# Patient Record
Sex: MALE | Race: WHITE | NOT HISPANIC OR LATINO | Employment: FULL TIME | URBAN - NONMETROPOLITAN AREA
[De-identification: names, ages, dates, MRNs, and addresses within clinical notes are randomized per-mention and may not be internally consistent; named-entity substitution may affect disease eponyms.]

---

## 2017-05-19 ENCOUNTER — OFFICE VISIT - GICH (OUTPATIENT)
Dept: FAMILY MEDICINE | Facility: OTHER | Age: 32
End: 2017-05-19

## 2017-05-19 ENCOUNTER — HISTORY (OUTPATIENT)
Dept: FAMILY MEDICINE | Facility: OTHER | Age: 32
End: 2017-05-19

## 2017-05-19 DIAGNOSIS — J00 ACUTE NASOPHARYNGITIS (COMMON COLD): ICD-10-CM

## 2018-01-05 NOTE — PROGRESS NOTES
Patient Information     Patient Name MRN Sex Lucho Cheema [id number] Male 1985      Progress Notes by Alessia Sepulveda NP at 2017  3:00 PM     Author:  Alessia Sepulveda NP Service:  (none) Author Type:  PHYS- Nurse Practitioner     Filed:  2017  6:33 PM Encounter Date:  2017 Status:  Signed     :  Alessia Sepulveda NP (PHYS- Nurse Practitioner)            Nursing Notes:   Jessica Salmon  2017  2:37 PM  Signed  Patient presents with runny nose, headache, cough nonproductive, sore throat starting Wednesday. Patient has a  child. Patient states he always gets bronchitis when he stops smoking. Patient is also taking fish oil 350mg, milk thistle 250 mg. Jessica Salmon LPN .............2017  2:21 PM  SUBJECTIVE:    Lucho Candelaria is a 32 y.o. male who presents for Cough congestion    URI    This is a new problem. Episode onset: 2-3 days. The problem has been unchanged. There has been no fever. Associated symptoms include congestion, coughing, a plugged ear sensation, rhinorrhea and a sore throat. Pertinent negatives include no ear pain, headaches, nausea, rash, sinus pain, sneezing, swollen glands, vomiting or wheezing. He has tried increased fluids for the symptoms. The treatment provided no relief.       Current Outpatient Prescriptions on File Prior to Visit       Medication  Sig Dispense Refill     albuterol HFA (PROVENTIL HFA) 90 mcg/actuation inhaler Inhale 2 Puffs by mouth every 4 hours if needed (cough). 1 Inhaler 0     amLODIPine (NORVASC) 10 mg tablet Take one half tab daily  0     aspirin 325 mg tablet Take 325 mg by mouth once daily with a meal.       atenolol (TENORMIN) 50 mg tablet Take 50 mg by mouth once daily.       Fenofibrate Nanocrystallized 160 mg tab Take  by mouth once daily with a meal.  0     hydrochlorothiazide 12.5 mg tablet Take 1 tablet by mouth once daily.  0     ibuprofen (ADVIL; MOTRIN) 800 mg tablet Take 1 tablet by mouth  "every 6 hours if needed for Pain. 40 tablet 0     lisinopril (PRINIVIL; ZESTRIL) 5 mg tablet Take 1 tablet by mouth once daily.  0     Omeprazole 20 mg tablet Take 1 tablet by mouth once daily.  0     Potassium Bicarb-Citric Acid 10 mEq tbef Take  by mouth.  0     No current facility-administered medications on file prior to visit.        REVIEW OF SYSTEMS:  Review of Systems   HENT: Positive for congestion, rhinorrhea and sore throat. Negative for ear pain and sneezing.    Respiratory: Positive for cough. Negative for wheezing.    Gastrointestinal: Negative for nausea and vomiting.   Skin: Negative for rash.   Neurological: Negative for headaches.       OBJECTIVE:  /78  Pulse 60  Temp 97.8  F (36.6  C) (Tympanic)  Ht 1.695 m (5' 6.73\")  Wt 100.3 kg (221 lb 3.2 oz)  BMI 34.92 kg/m2    EXAM:   Physical Exam   Constitutional: He is well-developed, well-nourished, and in no distress.   HENT:   Head: Normocephalic and atraumatic.   Right Ear: Tympanic membrane and ear canal normal.   Left Ear: Tympanic membrane and ear canal normal.   Nose: Rhinorrhea present. Right sinus exhibits no maxillary sinus tenderness and no frontal sinus tenderness. Left sinus exhibits no maxillary sinus tenderness and no frontal sinus tenderness.   Mouth/Throat: Uvula is midline and mucous membranes are normal. Posterior oropharyngeal erythema present. No oropharyngeal exudate or posterior oropharyngeal edema.   Eyes: Conjunctivae are normal.   Neck: Neck supple.   Cardiovascular: Normal rate, regular rhythm and normal heart sounds.    Pulmonary/Chest: Breath sounds normal. No respiratory distress. He has no decreased breath sounds. He has no wheezes. He has no rhonchi. He has no rales.   No cough is heard   Lymphadenopathy:     He has no cervical adenopathy.   Nursing note and vitals reviewed.      ASSESSMENT/PLAN:    ICD-10-CM    1. Acute nasopharyngitis J00         Plan:  He states he always gets Bronchitis. Asks for a z pac, as " he does not have time to be ill. Discussed viral illnesses including bronchitis. Discussed he has a cold, and otc and home cares are his best bet.   Symptoms likely due to virus. No antibiotic is needed at this time. Symptoms typically worse on days 2-5 and then stabilize and you are sick for days 5-12. Days 12-14 there is slow resolution and if there is a cough, studies show it can linger longer, however one is not as ill as in the beginning. If symptoms begin worsening or fail to improve after 14 days, return to clinic for reevaluation. All questions were answered and he is in agreement with plan.         ANA MARÍA ARCHULETA NP ....................  5/19/2017   6:33 PM

## 2018-01-05 NOTE — PATIENT INSTRUCTIONS
Patient Information     Patient Name MRN Lucho Yan [id number] Male 1985      Patient Instructions by Alessia Sepulveda NP at 2017  3:00 PM     Author:  Alessia Sepulveda NP Service:  (none) Author Type:  PHYS- Nurse Practitioner     Filed:  2017  2:24 PM Encounter Date:  2017 Status:  Signed     :  Alessia Sepulveda NP (PHYS- Nurse Practitioner)            Cold Medicines   What are cold medicines?  Symptoms of the common cold start gradually over several days and usually last about two weeks. Symptoms may include sneezing, a stuffy or runny nose, sore throat, cough, watery eyes, mild headache, or body aches. A cold will go away on its own without treatment. However, there are many nonprescription products that may help relieve some of the symptoms of a cold. Cold medicines often contain more than one ingredient and are used to treat more than one symptom. Read the labels and buy products that have only the ingredients that you need. If you are not sure which medicine is best, ask your pharmacist.  How do they work?  Decongestants reduce swelling in your nose and sinuses. They may also lessen the amount of mucus made by your nose. If you use decongestants more often than directed, your stuffy nose may get worse.   Antihistamines block the effect of histamine. Histamine is a chemical your body makes when you have an allergic reaction. Antihistamines are most often used to treat itchy or watery eyes or a stuffy or runny nose caused by an allergy. Antihistamines may not help a stuffy or runny nose caused by a cold because they can make mucus thick and dry.  Mucolytics are medicines that make mucus thinner so that it is easier to cough up out of your throat and lungs.  Expectorants are cough medicines that may help to keep the mucus thin and bring up mucus from the lungs when you cough. This may relieve chest congestion and make it easier to breathe.   Cough suppressants  (antitussives) are medicines that lessen the urge to cough. They may give relief from a dry, hacking cough. If you have a cough that is wet sounding and produces mucus, it is important for you to cough the mucus up out of your lungs. For this reason, cough suppressants are not recommended for a wet sounding cough.  Fever and pain relievers, such as acetaminophen, aspirin, or other nonsteroidal anti-inflammatory drugs (NSAIDs), are often included in cold medicine. Read labels carefully to avoid taking more medicine than you need.  What else do I need to know about this medicine?    Talk to your healthcare provider if your symptoms start suddenly or you have severe symptoms. This may mean you have something more serious than a cold.    Follow the directions that come with your medicine, including information about food or alcohol. Make sure you know how and when to take your medicine. Do not take more or less than you are supposed to take.    Try to get all of your medicine at the same place. Your pharmacist can help make sure that all of your medicines are safe to take together.    Keep a list of your medicines with you. List all of the prescription medicines, nonprescription medicines, supplements, natural remedies, and vitamins that you take. Tell all healthcare providers who treat you about all of the products you are taking.    Many medicines have side effects. A side effect is a symptom or problem that is caused by the medicine. Ask your healthcare provider or pharmacist what side effects the medicine may cause and what you should do if you have side effects.    Nonsteroidal anti-inflammatory medicines (NSAIDs), such as ibuprofen, naproxen, and aspirin, may cause stomach bleeding and other problems. These risks increase with age. Read the label and take as directed. Unless recommended by your healthcare provider, do not take for more than 10 days for any reason.    Acetaminophen may cause liver damage or other  problems. Unless recommended by your provider, don't take more than 3000 milligrams (mg) in 24 hours. To make sure you don t take too much, check other medicines you take to see if they also contain acetaminophen. Ask your provider if you need to avoid drinking alcohol while taking this medicine.  If you have any questions, ask your healthcare provider or pharmacist for more information. Be sure to keep all appointments for provider visits or tests.        Symptoms likely due to virus. No antibiotic is needed at this time. Symptoms typically worse on days 2-5 and then stabilize and you are sick for days 5-12. Days 12-14 there is slow resolution and if there is a cough, studies show it can linger longer, however one is not as ill as in the beginning. If symptoms begin worsening or fail to improve after 14 days, return to clinic for reevaluation.

## 2018-01-05 NOTE — NURSING NOTE
Patient Information     Patient Name MRN Sex Lucho Cheema [id number] Male 1985      Nursing Note by Jessica Salmon at 2017  3:00 PM     Author:  Jessica Salmon Service:  (none) Author Type:  NURS- Student Practical Nurse     Filed:  2017  2:37 PM Encounter Date:  2017 Status:  Signed     :  Jessica Salmon (NURS- Student Practical Nurse)            Patient presents with runny nose, headache, cough nonproductive, sore throat starting Wednesday. Patient has a  child. Patient states he always gets bronchitis when he stops smoking. Patient is also taking fish oil 350mg, milk thistle 250 mg. Jessica Salmon LPN .............2017  2:21 PM

## 2018-01-18 PROBLEM — Z72.0 TOBACCO USE: Status: ACTIVE | Noted: 2018-01-18

## 2018-01-18 RX ORDER — AMLODIPINE BESYLATE 10 MG/1
TABLET ORAL
COMMUNITY
Start: 2015-05-14

## 2018-01-18 RX ORDER — FENOFIBRATE 160 MG/1
TABLET ORAL
COMMUNITY
Start: 2015-05-14

## 2018-01-18 RX ORDER — ASPIRIN 325 MG
325 TABLET ORAL
COMMUNITY

## 2018-01-18 RX ORDER — IBUPROFEN 800 MG/1
800 TABLET, FILM COATED ORAL EVERY 6 HOURS PRN
COMMUNITY
Start: 2016-08-30

## 2018-01-18 RX ORDER — ATENOLOL 50 MG/1
50 TABLET ORAL DAILY
COMMUNITY

## 2018-01-18 RX ORDER — NICOTINE POLACRILEX 4 MG/1
20 GUM, CHEWING ORAL DAILY
COMMUNITY
Start: 2015-05-14

## 2018-01-18 RX ORDER — LISINOPRIL 5 MG/1
5 TABLET ORAL DAILY
COMMUNITY
Start: 2015-05-14 | End: 2021-08-23

## 2018-01-18 RX ORDER — HYDROCHLOROTHIAZIDE 12.5 MG/1
12.5 TABLET ORAL DAILY
COMMUNITY
Start: 2015-05-14 | End: 2021-08-23

## 2018-01-18 RX ORDER — ALBUTEROL SULFATE 90 UG/1
2 AEROSOL, METERED RESPIRATORY (INHALATION) EVERY 4 HOURS PRN
COMMUNITY
Start: 2016-08-30 | End: 2021-08-23

## 2018-01-27 VITALS
DIASTOLIC BLOOD PRESSURE: 78 MMHG | HEART RATE: 60 BPM | BODY MASS INDEX: 34.72 KG/M2 | SYSTOLIC BLOOD PRESSURE: 120 MMHG | TEMPERATURE: 97.8 F | HEIGHT: 67 IN | WEIGHT: 221.2 LBS

## 2020-11-23 ENCOUNTER — ALLIED HEALTH/NURSE VISIT (OUTPATIENT)
Dept: FAMILY MEDICINE | Facility: OTHER | Age: 35
End: 2020-11-23
Attending: FAMILY MEDICINE
Payer: COMMERCIAL

## 2020-11-23 DIAGNOSIS — R09.81 NASAL CONGESTION: Primary | ICD-10-CM

## 2020-11-23 PROCEDURE — U0003 INFECTIOUS AGENT DETECTION BY NUCLEIC ACID (DNA OR RNA); SEVERE ACUTE RESPIRATORY SYNDROME CORONAVIRUS 2 (SARS-COV-2) (CORONAVIRUS DISEASE [COVID-19]), AMPLIFIED PROBE TECHNIQUE, MAKING USE OF HIGH THROUGHPUT TECHNOLOGIES AS DESCRIBED BY CMS-2020-01-R: HCPCS | Mod: ZL | Performed by: FAMILY MEDICINE

## 2020-11-23 PROCEDURE — C9803 HOPD COVID-19 SPEC COLLECT: HCPCS

## 2020-11-23 PROCEDURE — 99207 PR NO CHARGE NURSE ONLY: CPT

## 2020-11-25 LAB
SARS-COV-2 RNA SPEC QL NAA+PROBE: NOT DETECTED
SPECIMEN SOURCE: NORMAL

## 2020-12-14 ENCOUNTER — HEALTH MAINTENANCE LETTER (OUTPATIENT)
Age: 35
End: 2020-12-14

## 2021-08-23 ENCOUNTER — OFFICE VISIT (OUTPATIENT)
Dept: FAMILY MEDICINE | Facility: OTHER | Age: 36
End: 2021-08-23
Attending: PHYSICIAN ASSISTANT
Payer: COMMERCIAL

## 2021-08-23 VITALS
OXYGEN SATURATION: 98 % | HEART RATE: 88 BPM | RESPIRATION RATE: 16 BRPM | DIASTOLIC BLOOD PRESSURE: 98 MMHG | TEMPERATURE: 99 F | SYSTOLIC BLOOD PRESSURE: 154 MMHG | WEIGHT: 206.3 LBS | BODY MASS INDEX: 32.57 KG/M2

## 2021-08-23 DIAGNOSIS — H65.91 RIGHT NON-SUPPURATIVE OTITIS MEDIA: Primary | ICD-10-CM

## 2021-08-23 PROCEDURE — 99203 OFFICE O/P NEW LOW 30 MIN: CPT | Performed by: PHYSICIAN ASSISTANT

## 2021-08-23 RX ORDER — LISINOPRIL/HYDROCHLOROTHIAZIDE 10-12.5 MG
1 TABLET ORAL DAILY
COMMUNITY
Start: 2021-07-21

## 2021-08-23 ASSESSMENT — PAIN SCALES - GENERAL: PAINLEVEL: NO PAIN (0)

## 2021-08-23 NOTE — PROGRESS NOTES
ASSESSMENT/PLAN:    I have reviewed the nursing notes.  I have reviewed the findings, diagnosis, plan and need for follow up with the patient.    1. Right non-suppurative otitis media  - amoxicillin-clavulanate (AUGMENTIN) 875-125 MG tablet; Take 1 tablet by mouth 2 times daily for 7 days  Dispense: 14 tablet; Refill: 0  -Vital signs stable. PE consistent with ear infection of right ears. Treatment of choice includes antibiotic regimen (Augmentin, alternating tylenol and ibuprofen every 4-6 hours as needed (if able, daily limits reviewed in AVS and verbally with patient), warm compresses, other symptomatic remedies. Avoid trauma to ear(s) such as Q-tips. If symptoms change or worsen, recommend follow up for reevaluation (high fevers, worsening pain, abnormal drainage or odor from ear, etc.). Discussed if ear infections become increasingly common, as this point, a referral to ENT can be made, typically by PCP. Patient is in agreement and understanding of the above treatment plan. All questions and concerns were addressed and answered to patient's satisfaction. AVS reviewed with patient.     Discussed warning signs/symptoms indicative of need to f/u    Follow up if symptoms persist or worsen or concerns    I explained my diagnostic considerations and recommendations to the patient, who voiced understanding and agreement with the treatment plan. All questions were answered. We discussed potential side effects of any prescribed or recommended therapies, as well as expectations for response to treatments.    Shante Camara PA-C  8/23/2021  4:19 PM    HPI:    Lucho Candelaria is a 36 year old male  who presents to [facility] today for concerns of right ear pain x 1-2 d duration.     Presence of the following:   No fevers or chills.  No sore throat/pharyngitis/tonsillitis.   No allergy/URI Symptoms  Yes Muffled Sounds/Change in Hearing  No Sensation of Fullness in Ear(s)  No Ringing in Ears/Tinnitus  No Balance Changes  No  Dizziness  No Headache   No Ear Drainage  No Teething  Additional Symptoms: No  Denies persistent hearing loss, foul smelling odor from ear, changes in vision, nausea, vomiting, diarrhea, chest pain, shortness of breath.     No Recent URI or other illness  History of otitis media: No  History of HEENT surgery (PE tubes, tonsillectomy/adenoidectomy, etc.): No  Recent Course of ABX: No    Treatments Tried: OTC analgesics  Prior History of Similar Symptoms: No    PCP - None local    History reviewed. No pertinent past medical history.  History reviewed. No pertinent surgical history.  Social History     Tobacco Use     Smoking status: Current Every Day Smoker     Packs/day: 1.00     Types: Cigarettes     Smokeless tobacco: Never Used   Substance Use Topics     Alcohol use: Yes     Alcohol/week: 3.0 standard drinks     Current Outpatient Medications   Medication Sig Dispense Refill     amLODIPine (NORVASC) 10 MG tablet Take one half tab daily       amoxicillin-clavulanate (AUGMENTIN) 875-125 MG tablet Take 1 tablet by mouth 2 times daily for 7 days 14 tablet 0     aspirin (GOODSENSE ASPIRIN) 325 MG tablet Take 325 mg by mouth daily with food       atenolol (TENORMIN) 50 MG tablet Take 50 mg by mouth daily       fenofibrate 160 MG tablet Take  by mouth once daily with a meal.       ibuprofen (ADVIL/MOTRIN) 800 MG tablet Take 800 mg by mouth every 6 hours as needed for pain       lisinopril-hydrochlorothiazide (ZESTORETIC) 10-12.5 MG tablet Take 1 tablet by mouth daily       omeprazole (RA OMEPRAZOLE) 20 MG tablet Take 20 mg by mouth daily       Potassium Bicarb-Citric Acid 10 MEQ TBEF Take  by mouth.       No Known Allergies  Past medical history, past surgical history, current medications and allergies reviewed and accurate to the best of my knowledge.      ROS:  Refer to HPI    BP (!) 154/98   Pulse 88   Temp 99  F (37.2  C) (Tympanic)   Resp 16   Wt 93.6 kg (206 lb 4.8 oz)   SpO2 98%   BMI 32.57 kg/m       EXAM:  General Appearance: Well appearing 36-year old male, appropriate appearance for age. No acute distress  Ears: Left TM intact, translucent with bony landmarks appreciated, no erythema, no effusion, no bulging, no purulence.  Right TM erythematous and bulging with trace effusion, no purulence noted.  Left auditory canal clear.  Right auditory canal clear.  Normal external ears, non tender.  Eyes: conjunctivae normal without erythema or irritation, corneas clear, no drainage or crusting, no eyelid swelling, pupils equal   Orophayrnx: moist mucous membranes, posterior pharynx without erythema, tonsils without hypertrophy, no erythema, no exudates or petechiae, no post nasal drip seen, no trismus, voice clear.    Sinuses:  No sinus tenderness upon palpation of the frontal or maxillary sinuses  Nose:  Bilateral nares: no erythema, no edema, no drainage or congestion   Neck: supple without adenopathy  Respiratory: normal chest wall and respirations.  Normal effort.  Clear to auscultation bilaterally, no wheezing, crackles or rhonchi.  No increased work of breathing.  No cough appreciated.  Cardiac: RRR with no murmurs  Dermatological: no rashes noted of exposed skin  Psychological: normal affect, alert, oriented, and pleasant.     Labs:  None     Xray:  None

## 2021-08-23 NOTE — NURSING NOTE
"Chief Complaint   Patient presents with     Ear Problem     Had pain in right ear.     Initial Pulse 88   Temp 99  F (37.2  C) (Tympanic)   Resp 16   Wt 93.6 kg (206 lb 4.8 oz)   SpO2 98%   BMI 32.57 kg/m   Estimated body mass index is 32.57 kg/m  as calculated from the following:    Height as of 5/19/17: 1.695 m (5' 6.73\").    Weight as of this encounter: 93.6 kg (206 lb 4.8 oz).     FOOD SECURITY SCREENING QUESTIONS  Hunger Vital Signs:  Within the past 12 months we worried whether our food would run out before we got money to buy more. Never  Within the past 12 months the food we bought just didn't last and we didn't have money to get more. Never      Medication Reconciliation: Complete      Clyde Truong LPN   "

## 2021-10-03 ENCOUNTER — HEALTH MAINTENANCE LETTER (OUTPATIENT)
Age: 36
End: 2021-10-03

## 2021-10-08 ENCOUNTER — ALLIED HEALTH/NURSE VISIT (OUTPATIENT)
Dept: FAMILY MEDICINE | Facility: OTHER | Age: 36
End: 2021-10-08
Attending: FAMILY MEDICINE
Payer: OTHER GOVERNMENT

## 2021-10-08 DIAGNOSIS — R05.9 COUGH: Primary | ICD-10-CM

## 2021-10-08 PROCEDURE — U0003 INFECTIOUS AGENT DETECTION BY NUCLEIC ACID (DNA OR RNA); SEVERE ACUTE RESPIRATORY SYNDROME CORONAVIRUS 2 (SARS-COV-2) (CORONAVIRUS DISEASE [COVID-19]), AMPLIFIED PROBE TECHNIQUE, MAKING USE OF HIGH THROUGHPUT TECHNOLOGIES AS DESCRIBED BY CMS-2020-01-R: HCPCS | Mod: ZL

## 2021-10-08 PROCEDURE — C9803 HOPD COVID-19 SPEC COLLECT: HCPCS

## 2021-10-09 LAB — SARS-COV-2 RNA RESP QL NAA+PROBE: NEGATIVE

## 2022-01-22 ENCOUNTER — HEALTH MAINTENANCE LETTER (OUTPATIENT)
Age: 37
End: 2022-01-22

## 2022-09-04 ENCOUNTER — HEALTH MAINTENANCE LETTER (OUTPATIENT)
Age: 37
End: 2022-09-04

## 2022-12-20 ENCOUNTER — HOSPITAL ENCOUNTER (EMERGENCY)
Facility: OTHER | Age: 37
Discharge: HOME | End: 2022-12-20
Attending: STUDENT IN AN ORGANIZED HEALTH CARE EDUCATION/TRAINING PROGRAM | Admitting: STUDENT IN AN ORGANIZED HEALTH CARE EDUCATION/TRAINING PROGRAM
Payer: COMMERCIAL

## 2022-12-20 VITALS
SYSTOLIC BLOOD PRESSURE: 132 MMHG | RESPIRATION RATE: 20 BRPM | BODY MASS INDEX: 33.3 KG/M2 | HEART RATE: 62 BPM | TEMPERATURE: 98.6 F | HEIGHT: 66 IN | OXYGEN SATURATION: 100 % | DIASTOLIC BLOOD PRESSURE: 93 MMHG

## 2022-12-20 DIAGNOSIS — S29.011A INTERCOSTAL MUSCLE STRAIN, INITIAL ENCOUNTER: ICD-10-CM

## 2022-12-20 PROCEDURE — 250N000013 HC RX MED GY IP 250 OP 250 PS 637: Performed by: STUDENT IN AN ORGANIZED HEALTH CARE EDUCATION/TRAINING PROGRAM

## 2022-12-20 PROCEDURE — 99283 EMERGENCY DEPT VISIT LOW MDM: CPT | Performed by: STUDENT IN AN ORGANIZED HEALTH CARE EDUCATION/TRAINING PROGRAM

## 2022-12-20 RX ORDER — BENZONATATE 100 MG/1
200 CAPSULE ORAL 3 TIMES DAILY PRN
Qty: 40 CAPSULE | Refills: 0 | Status: SHIPPED | OUTPATIENT
Start: 2022-12-20 | End: 2022-12-20

## 2022-12-20 RX ORDER — BENZONATATE 100 MG/1
200 CAPSULE ORAL ONCE
Status: COMPLETED | OUTPATIENT
Start: 2022-12-20 | End: 2022-12-20

## 2022-12-20 RX ORDER — ACETAMINOPHEN 325 MG/1
650 TABLET ORAL ONCE
Status: COMPLETED | OUTPATIENT
Start: 2022-12-20 | End: 2022-12-20

## 2022-12-20 RX ORDER — BENZONATATE 100 MG/1
200 CAPSULE ORAL 3 TIMES DAILY PRN
Qty: 40 CAPSULE | Refills: 0 | Status: SHIPPED | OUTPATIENT
Start: 2022-12-20 | End: 2023-10-11

## 2022-12-20 RX ORDER — LIDOCAINE 50 MG/G
1 PATCH TOPICAL ONCE
Status: DISCONTINUED | OUTPATIENT
Start: 2022-12-20 | End: 2022-12-20 | Stop reason: HOSPADM

## 2022-12-20 RX ADMIN — BENZONATATE 200 MG: 100 CAPSULE ORAL at 05:12

## 2022-12-20 RX ADMIN — IBUPROFEN 600 MG: 200 TABLET, FILM COATED ORAL at 05:12

## 2022-12-20 RX ADMIN — ACETAMINOPHEN 650 MG: 325 TABLET, FILM COATED ORAL at 05:12

## 2022-12-20 RX ADMIN — LIDOCAINE 5% 1 PATCH: 700 PATCH TOPICAL at 05:12

## 2022-12-20 ASSESSMENT — ACTIVITIES OF DAILY LIVING (ADL): ADLS_ACUITY_SCORE: 35

## 2022-12-20 NOTE — ED PROVIDER NOTES
Emergency Department Provider Note  : 1985 Age: 37 year old Sex: male MRN: [id number]    Chief Complaint   Patient presents with     Rib Pain       Medical Decision Making / Assessment / Plan   37 year old male with a PMH of HTN and ACS who presents with right lateral focal chest pain that began acutely after sneezing and is exacerbated with movement and coughing.  No reproducible pain to palpation along the ribs.  Vitally stable.  Lung sounds clear.  Patient is clearly splinting his breathing due to acute exacerbations of pain.  Presentation consistent with intercostal muscle strain.  No indication for imaging or medical work-up here.  We will treat with anti-inflammatories, lidocaine patches, and a cough suppressant and have him follow-up with his PCP.  Return precautions given.          The patient was informed of the plan and verbalized understanding and agreed with the plan. The patient was given strict return to Emergency Department precautions as well as appropriate follow up instructions. The patient was discharged in stable condition.    New Prescriptions    BENZONATATE (TESSALON) 100 MG CAPSULE    Take 2 capsules (200 mg) by mouth 3 times daily as needed for cough       Final diagnoses:   Intercostal muscle strain, initial encounter       Isidro Diego MD  2022   Emergency Department    Pilar Yepez is a 37 year old male with PMH of HTN and report of ACS who presents at  4:51 AM for evaluation of acute right lateral chest wall pain after sneezing shortly prior to arrival.  Patient has been coughing intermittently over the past several weeks and coughing as well as moving appears to exacerbate acute pain described as largely over the lateral aspect of the right chest wall inferiorly.  No trauma to that area.  Pain particularly worsened with palpation in that area.  No medications taken prior to coming in.  No other symptoms or complaints.    I have reviewed the Medications, Allergies,  "Past Medical and Surgical History, and Social History in the Epic System and with family.    Review of Systems:  Please see HPI for pertinent positives and negatives. All other systems reviewed and found to be negative.      Objective   BP: (!) 132/93  Pulse: 62  Temp: 98.6  F (37  C)  Resp: 20  Height: 167.6 cm (5' 6\")  SpO2: 100 %    Physical Exam:   Gen: Comfortable. NAD  HEENT: MMM. AT/NC.  Eye: EOMI.   CV: Well perfused. RRR.  Pulm:  Clear lung sounds throughout right lung field.  MSK: No reproducible pain to palpation over of the right chest wall.  No contusion or edema.  Abd: ND.   Ext: No significant edema.  Neuro: AOx3, no focal deficit noted  Psych: Appropriate affect, cognition intact    Procedures / Critical Care   Procedures    Critical Care Time: none         Medical/Surgical History:  No past medical history on file.  No past surgical history on file.    Medications:  Current Facility-Administered Medications   Medication     lidocaine (LIDODERM) 5 % Patch 1 patch     Current Outpatient Medications   Medication     benzonatate (TESSALON) 100 MG capsule     amLODIPine (NORVASC) 10 MG tablet     aspirin (GOODSENSE ASPIRIN) 325 MG tablet     atenolol (TENORMIN) 50 MG tablet     fenofibrate 160 MG tablet     ibuprofen (ADVIL/MOTRIN) 800 MG tablet     lisinopril-hydrochlorothiazide (ZESTORETIC) 10-12.5 MG tablet     omeprazole (RA OMEPRAZOLE) 20 MG tablet     Potassium Bicarb-Citric Acid 10 MEQ TBEF       Allergies:  Patient has no known allergies.    Relevant labs, images, EKGs, Epic and outside hospital (if applicable) charts were reviewed. The findings, diagnosis, plan, and need for follow up were discussed with the patient/family. Nursing notes were reviewed.      Isidro Diego MD  12/20/22 3027       Isidro Diego MD  12/20/22 9948    "

## 2022-12-20 NOTE — ED TRIAGE NOTES
Triage Assessment     Row Name 12/20/22 0454       Triage Assessment (Adult)    Airway WDL WDL       Respiratory WDL    Respiratory WDL --  pain in right side of ribs       Skin Circulation/Temperature WDL    Skin Circulation/Temperature WDL WDL       Cardiac WDL    Cardiac WDL WDL

## 2022-12-20 NOTE — ED TRIAGE NOTES
Pt states he sneezed about 0320, felt a crack in the right side of ribs.      Triage Assessment     Row Name 12/20/22 0454       Triage Assessment (Adult)    Airway WDL WDL       Respiratory WDL    Respiratory WDL --  pain in right side of ribs       Skin Circulation/Temperature WDL    Skin Circulation/Temperature WDL WDL       Cardiac WDL    Cardiac WDL WDL

## 2022-12-20 NOTE — DISCHARGE INSTRUCTIONS
We discussed, like you to use anti-inflammatories like ibuprofen and Tylenol in addition to over-the-counter lidocaine patches applied to the most painful area along the right aspect of your chest wall for management of pain from your intercostal muscle strain.  I prescribed a cough suppressant he can use to reduce the cough burden every time he coughs reactivating those muscles and triggering additional pain.  If symptoms not significantly improved over the next couple days with above therapy, touch base with your primary care provider and they can talk to you about additional medications at that time if indicated.

## 2022-12-20 NOTE — Clinical Note
Lucho Candelaria was seen and treated in our emergency department on 12/20/2022.  He may return to work on 12/22/2022.       If you have any questions or concerns, please don't hesitate to call.      Isidro Diego MD

## 2023-04-29 ENCOUNTER — HEALTH MAINTENANCE LETTER (OUTPATIENT)
Age: 38
End: 2023-04-29

## 2023-10-02 ENCOUNTER — OFFICE VISIT (OUTPATIENT)
Dept: FAMILY MEDICINE | Facility: OTHER | Age: 38
End: 2023-10-02
Attending: NURSE PRACTITIONER
Payer: COMMERCIAL

## 2023-10-02 ENCOUNTER — HOSPITAL ENCOUNTER (OUTPATIENT)
Dept: GENERAL RADIOLOGY | Facility: OTHER | Age: 38
Discharge: HOME | End: 2023-10-02
Payer: COMMERCIAL

## 2023-10-02 VITALS
DIASTOLIC BLOOD PRESSURE: 82 MMHG | RESPIRATION RATE: 32 BRPM | SYSTOLIC BLOOD PRESSURE: 132 MMHG | OXYGEN SATURATION: 94 % | HEART RATE: 86 BPM | HEIGHT: 66 IN | WEIGHT: 207 LBS | TEMPERATURE: 100.6 F | BODY MASS INDEX: 33.27 KG/M2

## 2023-10-02 DIAGNOSIS — R50.9 FEVER IN ADULT: ICD-10-CM

## 2023-10-02 DIAGNOSIS — J18.9 MULTIFOCAL PNEUMONIA: Primary | ICD-10-CM

## 2023-10-02 DIAGNOSIS — R05.8 COUGH PRESENT FOR GREATER THAN 3 WEEKS: ICD-10-CM

## 2023-10-02 LAB
FLUAV RNA SPEC QL NAA+PROBE: NEGATIVE
FLUBV RNA RESP QL NAA+PROBE: NEGATIVE
GROUP A STREP BY PCR: NOT DETECTED
RSV RNA SPEC NAA+PROBE: NEGATIVE
SARS-COV-2 RNA RESP QL NAA+PROBE: NEGATIVE

## 2023-10-02 PROCEDURE — C9803 HOPD COVID-19 SPEC COLLECT: HCPCS

## 2023-10-02 PROCEDURE — 99214 OFFICE O/P EST MOD 30 MIN: CPT

## 2023-10-02 PROCEDURE — 71046 X-RAY EXAM CHEST 2 VIEWS: CPT

## 2023-10-02 PROCEDURE — 87637 SARSCOV2&INF A&B&RSV AMP PRB: CPT | Mod: ZL

## 2023-10-02 PROCEDURE — 87651 STREP A DNA AMP PROBE: CPT | Mod: ZL

## 2023-10-02 RX ORDER — ALBUTEROL SULFATE 90 UG/1
2 AEROSOL, METERED RESPIRATORY (INHALATION) EVERY 6 HOURS PRN
Qty: 18 G | Refills: 0 | Status: SHIPPED | OUTPATIENT
Start: 2023-10-02 | End: 2024-02-21

## 2023-10-02 RX ORDER — ALBUTEROL SULFATE 90 UG/1
AEROSOL, METERED RESPIRATORY (INHALATION)
COMMUNITY
Start: 2022-06-24 | End: 2023-10-11

## 2023-10-02 RX ORDER — AZITHROMYCIN 250 MG/1
250 TABLET, FILM COATED ORAL DAILY
Qty: 6 TABLET | Refills: 0 | Status: SHIPPED | OUTPATIENT
Start: 2023-10-02 | End: 2023-10-11

## 2023-10-02 RX ORDER — AMOXICILLIN 500 MG/1
500 CAPSULE ORAL 3 TIMES DAILY
Qty: 30 CAPSULE | Refills: 0 | Status: SHIPPED | OUTPATIENT
Start: 2023-10-02 | End: 2023-10-11

## 2023-10-02 ASSESSMENT — PAIN SCALES - GENERAL: PAINLEVEL: SEVERE PAIN (6)

## 2023-10-02 NOTE — PROGRESS NOTES
ASSESSMENT/PLAN:    I have reviewed the nursing notes.  I have reviewed the findings, diagnosis, plan and need for follow up with the patient.    1. Multifocal pneumonia  2. Cough present for greater than 3 weeks  3. Fever in adult  - amoxicillin (AMOXIL) 500 MG capsule; Take 1 capsule (500 mg) by mouth 3 times daily For 7 days  Dispense: 30 capsule; Refill: 0  - azithromycin (ZITHROMAX) 250 MG tablet; Take 1 tablet (250 mg) by mouth daily Take 2 tablets the first day and 1 tablet each day after  Dispense: 6 tablet; Refill: 0  - albuterol (PROAIR HFA/PROVENTIL HFA/VENTOLIN HFA) 108 (90 Base) MCG/ACT inhaler; Inhale 2 puffs into the lungs every 6 hours as needed  Dispense: 18 g; Refill: 0  - XR Chest 2 Views  - Symptomatic Influenza A/B, RSV, & SARS-CoV2 PCR (COVID-19) Nose  - Group A Streptococcus PCR Throat Swab    Patient presents with upper respiratory symptoms.  Patient's vitals are stable except for increased respiratory rate and temperature.  Patient appears ill but nontoxic.  Chest x-ray indicates multifocal pneumonia involving the lingula and right middle lobe.  Will treat with amoxicillin and azithromycin.  Will treat patient's cough and shortness of breath with an albuterol inhaler as needed. Discussed symptomatic treatment - Encouraged fluids, salt water gargles, honey, elevation, humidifier, sinus rinse/netti pot, lozenges, tea, topical vapor rub, popsicles, rest, etc. May use over-the-counter Tylenol or ibuprofen PRN.  Advised patient that he should follow-up with his PCP in about 2 weeks for a follow-up to make sure that the infection is resolving.    Discussed warning signs/symptoms indicative of need to f/u    Follow up if symptoms persist or worsen or concerns    I explained my diagnostic considerations and recommendations to the patient, who voiced understanding and agreement with the treatment plan. All questions were answered. We discussed potential side effects of any prescribed or recommended  therapies, as well as expectations for response to treatments.    ARIADNA Stephens CNP  10/2/2023  6:59 PM    HPI:    Lucho Candelaria is a 38 year old male  who presents to [facility] today for concerns of URI symptoms    URI, x 2-3 weeks    Symptoms:  YES: +  fevers or chills. Fever, highest reported temperature: 100.6 F  YES: +  sore throat/pharyngitis/tonsillitis.   YES: +  allergy/URI Symptoms  YES: +  muffled sounds/change in hearing  YES: +  sensation of fullness in ear(s)  No ringing in ears/tinnitus  No balance changes  YES: +  dizziness  YES: +  congestion (head/nasal/chest)  YES: +  cough/productive cough  YES: +  post nasal drip   YES: +  headache  YES: +  sinus pain/pressure - occasional  YES: +  myalgias  No otalgia  No rash  Activity Level Changes: Yes: increased fatigue  Appetite/Liquid Intake Changes: Yes: decreased  Changes to Bowel Habits: Yes: diarrhea occasional  Changes to Bladder Habits: No  Additional Symptoms to Report: Yes: shortness of breath, wheezing, occasional chest tightness  History of similar symptoms: Yes  Prior workup: No    Treatments tried: Tylenol/Ibuprofen, Decongestants, OTC Cough med, Fluids, and Rest    Site of exposure: not known.  Type of exposure: not known    Other Pertinent History: asthma, tobacco use, and s/p tonsillectomy    Allergies: adhesive tape    PCP: Dr. Yeh    History reviewed. No pertinent past medical history.  History reviewed. No pertinent surgical history.  Social History     Tobacco Use    Smoking status: Every Day     Packs/day: 0.50     Types: Cigarettes    Smokeless tobacco: Never   Substance Use Topics    Alcohol use: Yes     Alcohol/week: 3.0 standard drinks of alcohol     Current Outpatient Medications   Medication Sig Dispense Refill    amLODIPine (NORVASC) 10 MG tablet Take one half tab daily      aspirin (GOODSENSE ASPIRIN) 325 MG tablet Take 325 mg by mouth daily with food      atenolol (TENORMIN) 50 MG tablet Take 50 mg by mouth daily   "    fenofibrate 160 MG tablet Take  by mouth once daily with a meal.      ibuprofen (ADVIL/MOTRIN) 800 MG tablet Take 800 mg by mouth every 6 hours as needed for pain      lisinopril-hydrochlorothiazide (ZESTORETIC) 10-12.5 MG tablet Take 1 tablet by mouth daily      omeprazole (RA OMEPRAZOLE) 20 MG tablet Take 20 mg by mouth daily      Potassium Bicarb-Citric Acid 10 MEQ TBEF Take  by mouth.      albuterol (PROAIR HFA/PROVENTIL HFA/VENTOLIN HFA) 108 (90 Base) MCG/ACT inhaler Inhale 1-2 Puffs into the lungs every four hours as needed for Shortness of Breath. Shake before using. (Patient not taking: Reported on 10/2/2023)      benzonatate (TESSALON) 100 MG capsule Take 2 capsules (200 mg) by mouth 3 times daily as needed for cough (Patient not taking: Reported on 10/2/2023) 40 capsule 0     Allergies   Allergen Reactions    Adhesive Tape      Past medical history, past surgical history, current medications and allergies reviewed and accurate to the best of my knowledge.      ROS:  Refer to HPI    /82 (BP Location: Right arm, Patient Position: Sitting, Cuff Size: Adult Regular)   Pulse 86   Temp (!) 100.6  F (38.1  C) (Tympanic)   Resp (!) 32   Ht 1.676 m (5' 6\")   Wt 93.9 kg (207 lb)   SpO2 94%   BMI 33.41 kg/m      EXAM:  General Appearance: Ill appearing 38 year old male, appropriate appearance for age. No acute distress   Ears: Left TM intact, translucent with bony landmarks appreciated, no erythema, no effusion, no bulging, no purulence.  Right TM intact, translucent with bony landmarks appreciated, no erythema, no effusion, no bulging, no purulence.  Left auditory canal clear.  Right auditory canal clear.  Normal external ears, non tender.  Eyes: conjunctivae normal without erythema or irritation, corneas clear, no drainage or crusting, no eyelid swelling, pupils equal   Oropharynx: moist mucous membranes, posterior pharynx without erythema, tonsils symmetric and 1+, no erythema, no exudates or " petechiae, no post nasal drip seen, no trismus, voice clear.    Sinuses:  Mild sinus tenderness upon palpation of the frontal and maxillary sinuses  Nose:  Bilateral nares: no erythema, no edema, no drainage or congestion   Neck: supple with bilateral tonsillar adenopathy  Respiratory: Increased chest wall and respirations.  Mildly increased effort.  Expiratory wheezing throughout all lung fields, no crackles or rhonchi.  Mild increased work of breathing.  Moderate cough appreciated.  Cardiac: RRR with no murmurs  Musculoskeletal:  Equal movement of bilateral upper extremities.  Equal movement of bilateral lower extremities.  Normal gait.    Dermatological: no rashes noted of exposed skin  Neuro: Alert and oriented to person, place, and time.    Psychological: normal affect, alert, oriented, and pleasant.     Labs & Xray:  Results for orders placed or performed in visit on 10/02/23   XR Chest 2 Views     Status: None    Narrative    Procedure:XR CHEST 2 VIEWS    Clinical history:Male, 38 years, Cough present for greater than 3  weeks; Fever in adult    Technique: Two views are submitted.    Comparison: 5/17/2014    Findings: The cardiac silhouette is within normal limits.. The  pulmonary vasculature is within normal limits.    The lungs demonstrate subtle, patchy areas of airspace consolidation  in the lingula and right middle lobe. Bony structures are  unremarkable.      Impression    Impression:   Multifocal pneumonia involving the lingula and right middle lobe.     COURTNEY RYAN MD         SYSTEM ID:  [id number]   Symptomatic Influenza A/B, RSV, & SARS-CoV2 PCR (COVID-19) Nose     Status: Normal    Specimen: Nose; Swab   Result Value Ref Range    Influenza A PCR Negative Negative    Influenza B PCR Negative Negative    RSV PCR Negative Negative    SARS CoV2 PCR Negative Negative    Narrative    Testing was performed using the Xpert Xpress CoV2/Flu/RSV Assay on the Cepheid GeneXpert Instrument. This test should  be ordered for the detection of SARS-CoV-2, influenza, and RSV viruses in individuals who meet clinical and/or epidemiological criteria. Test performance is unknown in asymptomatic patients. This test is for in vitro diagnostic use under the FDA EUA for laboratories certified under CLIA to perform high or moderate complexity testing. This test has not been FDA cleared or approved. A negative result does not rule out the presence of PCR inhibitors in the specimen or target RNA in concentration below the limit of detection for the assay. If only one viral target is positive but coinfection with multiple targets is suspected, the sample should be re-tested with another FDA cleared, approved, or authorized test, if coinfection would change clinical management. This test was validated by the [facility]. These laboratories are certified under the Clinical Laboratory Improvement Amendments of 1988 (CLIA-88) as qualified to perform high complexity laboratory testing.

## 2023-10-02 NOTE — LETTER
October 2, 2023      Lucho Candelaria  [address]  [place] [id number]        To Whom It May Concern:    Lucho Candelaria  was seen on 10/2/23.  Please excuse him  until 10/5/23 due to illness.        Sincerely,        ARIADNA Stephens CNP

## 2023-10-02 NOTE — PROGRESS NOTES
"Chief Complaint   Patient presents with    Cough     X 3.5 weeks    Headache     X 2.5 weeks    Back Pain     All the way to neck for a few days         Initial There were no vitals taken for this visit. Estimated body mass index is 33.3 kg/m  as calculated from the following:    Height as of 12/20/22: 1.676 m (5' 6\").    Weight as of 8/23/21: 93.6 kg (206 lb 4.8 oz).     Advance Care Directive on file? ***  Advance Care Directive provided to patient? ***    FOOD SECURITY SCREENING QUESTIONS:    The next two questions are to help us understand your food security.  If you are feeling you need any assistance in this area, we have resources available to support you today.    Hunger Vital Signs:  Within the past 12 months we worried whether our food would run out before we got money to buy more. {food list:323816}  Within the past 12 months the food we bought just didn't last and we didn't have money to get more. {food list:217665}  Fiona Osei LPN,LPN on 10/2/2023 at 6:53 PM      Fiona Osei LPN     "

## 2023-10-02 NOTE — NURSING NOTE
"Chief Complaint   Patient presents with    Cough     X 3.5 weeks    Headache     X 2.5 weeks    Back Pain     All the way to neck for a few days     Attempted to tx with day/nightquil, mucinex, alkaseltzer, and ibuprofen with little relief. Sx worsening  Patient is out of albuterol inhaler.    Initial /82 (BP Location: Right arm, Patient Position: Sitting, Cuff Size: Adult Regular)   Pulse 86   Temp (!) 100.6  F (38.1  C) (Tympanic)   Resp (!) 32   Ht 1.676 m (5' 6\")   Wt 93.9 kg (207 lb)   SpO2 94%   BMI 33.41 kg/m   Estimated body mass index is 33.41 kg/m  as calculated from the following:    Height as of this encounter: 1.676 m (5' 6\").    Weight as of this encounter: 93.9 kg (207 lb).     Advance Care Directive on file? no    FOOD SECURITY SCREENING QUESTIONS:    The next two questions are to help us understand your food security.  If you are feeling you need any assistance in this area, we have resources available to support you today.    Hunger Vital Signs:  Within the past 12 months we worried whether our food would run out before we got money to buy more. Never  Within the past 12 months the food we bought just didn't last and we didn't have money to get more. Never  Fiona Osei LPN,LPN on 10/2/2023 at 6:56 PM      Fiona Osei LPN     "

## 2023-10-11 ENCOUNTER — NURSE TRIAGE (OUTPATIENT)
Dept: NURSING | Facility: CLINIC | Age: 38
End: 2023-10-11
Payer: COMMERCIAL

## 2023-10-11 ENCOUNTER — OFFICE VISIT (OUTPATIENT)
Dept: FAMILY MEDICINE | Facility: OTHER | Age: 38
End: 2023-10-11
Attending: NURSE PRACTITIONER
Payer: COMMERCIAL

## 2023-10-11 VITALS
SYSTOLIC BLOOD PRESSURE: 132 MMHG | RESPIRATION RATE: 16 BRPM | HEIGHT: 66 IN | DIASTOLIC BLOOD PRESSURE: 88 MMHG | BODY MASS INDEX: 33.22 KG/M2 | HEART RATE: 91 BPM | TEMPERATURE: 98.7 F | OXYGEN SATURATION: 95 % | WEIGHT: 206.7 LBS

## 2023-10-11 DIAGNOSIS — L29.89 PRURITIC ERYTHEMATOUS RASH: Primary | ICD-10-CM

## 2023-10-11 PROCEDURE — 99213 OFFICE O/P EST LOW 20 MIN: CPT | Performed by: NURSE PRACTITIONER

## 2023-10-11 RX ORDER — PREDNISONE 10 MG/1
TABLET ORAL
Qty: 30 TABLET | Refills: 0 | Status: SHIPPED | OUTPATIENT
Start: 2023-10-11 | End: 2023-10-21

## 2023-10-11 ASSESSMENT — PAIN SCALES - GENERAL: PAINLEVEL: NO PAIN (0)

## 2023-10-11 NOTE — TELEPHONE ENCOUNTER
Pt calling, 2 weeks ago pt had pneumonia, was prescribed amoxicillin and z pack  Now have itching rash, blisters, open sores that are oozing  Penis and scrotum is inflammed, itches and hurting  Rash Started on neck after done with z-pack on Saturday 10/7  done with amoxicillin on Monday 10/9    Triage be seen within 4 hours, pt's PCP is not with , denies UC nearby, will go to ED in [place]. Care advice given    Lane Rivera RN, BSN  10/11/2023 at 6:24 PM  [facility]      Reason for Disposition   Large or small blisters on skin (i.e., fluid filled bubbles or sacs)    Additional Information   Negative: [1] Life-threatening reaction (anaphylaxis) in the past to the same drug AND [2] < 2 hours since exposure   Negative: Difficulty breathing or wheezing   Negative: [1] Hoarseness or cough AND [2] started soon after 1st dose of drug   Negative: [1] Swollen tongue AND [2] started soon after 1st dose of drug   Negative: [1] Purple or blood-colored rash (spots or dots) AND [2] fever   Negative: Sounds like a life-threatening emergency to the triager   Negative: Swollen tongue   Negative: [1] Widespread hives AND [2] onset < 2 hours of exposure to 1st dose of drug   Negative: Fever   Negative: Patient sounds very sick or weak to the triager   Negative: [1] Purple or blood-colored rash (spots or dots) AND [2] no fever AND [3] sounds well to triager   Negative: [1] Taking new prescription medication AND [2] rash within 4 hours of 1st dose    Protocols used: Rash - Widespread On Drugs-A-

## 2023-10-12 NOTE — NURSING NOTE
"Chief Complaint   Patient presents with    Rash    Derm Problem     Itching      Patient is here for a rash and itching. 2 weeks ago patient had pneumonia and was prescribed Amoxicillin. Now has itching rash, open sores, and oozing.     Initial /88   Pulse 91   Temp 98.7  F (37.1  C) (Tympanic)   Resp 16   Ht 1.676 m (5' 6\")   Wt 93.8 kg (206 lb 11.2 oz)   SpO2 95%   BMI 33.36 kg/m   Estimated body mass index is 33.36 kg/m  as calculated from the following:    Height as of this encounter: 1.676 m (5' 6\").    Weight as of this encounter: 93.8 kg (206 lb 11.2 oz).  Medication Reconciliation: complete    Araseli Payton CMA      FOOD SECURITY SCREENING QUESTIONS:    The next two questions are to help us understand your food security.  If you are feeling you need any assistance in this area, we have resources available to support you today.    Hunger Vital Signs:  Within the past 12 months we worried whether our food would run out before we got money to buy more. Never  Within the past 12 months the food we bought just didn't last and we didn't have money to get more. Never  Araseli Payton CMA,LPN on 10/11/2023 at 7:10 PM    "

## 2023-10-12 NOTE — PROGRESS NOTES
ASSESSMENT/PLAN:     I have reviewed the nursing notes.  I have reviewed the findings, diagnosis, plan and need for follow up with the patient.          1. Pruritic erythematous rash    - predniSONE (DELTASONE) 10 MG tablet; Take 4 tablets (40 mg) by mouth daily for 5 days, THEN 2 tablets (20 mg) daily for 5 days.  Dispense: 30 tablet; Refill: 0      Possible Amoxicillin allergy rash as rash started at end of Amoxicillin course but rash is not classic appearance of Amoxicillin rash.  Added Amoxicillin to allergy list as precaution.    Recommend Zyrtec 2 tabs BID until rash and pruritus resolved  Recommend use of Calamine lotion to areas of weeping rash/lesions   Recommend use of Hydrocortisone cream to areas of dry rash   Use cool showers over hot showers to reduce pruritus     Discussed warning signs/symptoms indicative of need to f/u  Follow up if symptoms persist or worsen or concerns      I explained my diagnostic considerations and recommendations to the patient, who voiced understanding and agreement with the treatment plan. All questions were answered. We discussed potential side effects of any prescribed or recommended therapies, as well as expectations for response to treatments.    Tarah Pelaez NP  [facility]      SUBJECTIVE:   Lucho Candelaria is a 38 year old male who presents to clinic today for the following health issues:  Itching and blisters    HPI  Patient was treated for multifocal pneumonia with Zpak and Amoxicillin on 10/2/23.    Patient completed Zpak on 10/7/23 and Amoxicillin on 10/9/23.    Patient is now experiencing itching rash with blisters and open oozing sores for the past 3 days.  Rash is intensely pruritic.  Rash is not painful.  Some burning sensation of groin after scratching.  Started on right side of neck and spread to face, neck and groin.  No oral lesions.  No known outdoor exposures.  No new foods.  No new skin products.  No new detergents.  No throat  swelling or difficulty swallowing.  No shortness of breath.  NO fevers.  No OTC treatments tried.          No past medical history on file.  No past surgical history on file.  Social History     Tobacco Use    Smoking status: Every Day     Packs/day: .5     Types: Cigarettes    Smokeless tobacco: Never   Substance Use Topics    Alcohol use: Yes     Alcohol/week: 3.0 standard drinks of alcohol     Types: 3 Standard drinks or equivalent per week     Current Outpatient Medications   Medication Sig Dispense Refill    albuterol (PROAIR HFA/PROVENTIL HFA/VENTOLIN HFA) 108 (90 Base) MCG/ACT inhaler       amLODIPine (NORVASC) 10 MG tablet Take one half tab daily      aspirin (GOODSENSE ASPIRIN) 325 MG tablet Take 325 mg by mouth daily with food      atenolol (TENORMIN) 50 MG tablet Take 50 mg by mouth daily      fenofibrate 160 MG tablet Take  by mouth once daily with a meal.      ibuprofen (ADVIL/MOTRIN) 800 MG tablet Take 800 mg by mouth every 6 hours as needed for pain      lisinopril-hydrochlorothiazide (ZESTORETIC) 10-12.5 MG tablet Take 1 tablet by mouth daily      omeprazole (RA OMEPRAZOLE) 20 MG tablet Take 20 mg by mouth daily      Potassium Bicarb-Citric Acid 10 MEQ TBEF Take  by mouth.      albuterol (PROAIR HFA/PROVENTIL HFA/VENTOLIN HFA) 108 (90 Base) MCG/ACT inhaler Inhale 2 puffs into the lungs every 6 hours as needed 18 g 0    amoxicillin (AMOXIL) 500 MG capsule Take 1 capsule (500 mg) by mouth 3 times daily For 7 days 30 capsule 0    azithromycin (ZITHROMAX) 250 MG tablet Take 1 tablet (250 mg) by mouth daily Take 2 tablets the first day and 1 tablet each day after 6 tablet 0    benzonatate (TESSALON) 100 MG capsule Take 2 capsules (200 mg) by mouth 3 times daily as needed for cough 40 capsule 0     Allergies   Allergen Reactions    Adhesive Tape          Past medical history, past surgical history, current medications and allergies reviewed and accurate to the best of my knowledge.        OBJECTIVE:     BP  "132/88   Pulse 91   Temp 98.7  F (37.1  C) (Tympanic)   Resp 16   Ht 1.676 m (5' 6\")   Wt 93.8 kg (206 lb 11.2 oz)   SpO2 95%   BMI 33.36 kg/m    Body mass index is 33.36 kg/m .      Physical Exam  General Appearance: Well appearing adult male, appropriate appearance for age. No acute distress  Orophayrnx: moist mucous membranes, pharynx without erythema, tonsils without hypertrophy, tonsils without erythema, no tonsillar exudates, no oral lesions, no palate petechiae, no post nasal drip seen, no trismus, voice clear.    Respiratory: normal chest wall and respirations.  Normal effort.  Occasional  cough appreciated.  Cardiac: RRR with no murmurs  Musculoskeletal:  Equal movement of bilateral upper extremities.  Equal movement of bilateral lower extremities.  Normal gait.    Dermatological: beard with few oozing and crusted lesions.  Anterior chest with erythema, no discrete lesions.  Scrotum and penis with diffuse erythema, no discrete lesions.  Upper eyelids with erythema, no swelling or lesions.    Psychological: normal affect, alert, oriented, and pleasant.           "

## 2023-10-12 NOTE — PATIENT INSTRUCTIONS
Zyrtec 2 tabs twice daily for itching, take until rash/itching resolved    Prednisone - take 4 tabs once daily for 5 days then decrease to 2 tabs daily for 5 days or until rash/itching resolved.  Take with food.    Use hydrocortisone cream up to 3 times daily as needed for itching    Use calamine lotion to groin for itching     Cool showers as hot water can worsen itching

## 2024-02-21 ENCOUNTER — HOSPITAL ENCOUNTER (OUTPATIENT)
Dept: GENERAL RADIOLOGY | Facility: OTHER | Age: 39
Discharge: HOME | End: 2024-02-21
Payer: COMMERCIAL

## 2024-02-21 ENCOUNTER — OFFICE VISIT (OUTPATIENT)
Dept: FAMILY MEDICINE | Facility: OTHER | Age: 39
End: 2024-02-21
Payer: COMMERCIAL

## 2024-02-21 VITALS
HEART RATE: 58 BPM | OXYGEN SATURATION: 100 % | RESPIRATION RATE: 18 BRPM | HEIGHT: 67 IN | TEMPERATURE: 98.5 F | BODY MASS INDEX: 34.21 KG/M2 | DIASTOLIC BLOOD PRESSURE: 88 MMHG | SYSTOLIC BLOOD PRESSURE: 132 MMHG | WEIGHT: 218 LBS

## 2024-02-21 DIAGNOSIS — R05.3 PERSISTENT COUGH: ICD-10-CM

## 2024-02-21 DIAGNOSIS — J06.9 VIRAL URI WITH COUGH: Primary | ICD-10-CM

## 2024-02-21 DIAGNOSIS — R06.02 SHORTNESS OF BREATH: ICD-10-CM

## 2024-02-21 DIAGNOSIS — R06.2 WHEEZING: ICD-10-CM

## 2024-02-21 PROCEDURE — 71046 X-RAY EXAM CHEST 2 VIEWS: CPT

## 2024-02-21 PROCEDURE — 99214 OFFICE O/P EST MOD 30 MIN: CPT

## 2024-02-21 PROCEDURE — 250N000009 HC RX 250

## 2024-02-21 RX ORDER — ALBUTEROL SULFATE 90 UG/1
2 AEROSOL, METERED RESPIRATORY (INHALATION) EVERY 6 HOURS PRN
Qty: 18 G | Refills: 0 | Status: SHIPPED | OUTPATIENT
Start: 2024-02-21

## 2024-02-21 RX ORDER — PREDNISONE 20 MG/1
TABLET ORAL
Qty: 20 TABLET | Refills: 0 | Status: SHIPPED | OUTPATIENT
Start: 2024-02-21

## 2024-02-21 RX ORDER — DEXAMETHASONE SODIUM PHOSPHATE 4 MG/ML
10 VIAL (ML) INJECTION ONCE
Status: COMPLETED | OUTPATIENT
Start: 2024-02-21 | End: 2024-02-21

## 2024-02-21 RX ORDER — BENZONATATE 100 MG/1
100 CAPSULE ORAL 3 TIMES DAILY PRN
Qty: 90 CAPSULE | Refills: 0 | Status: SHIPPED | OUTPATIENT
Start: 2024-02-21 | End: 2024-03-22

## 2024-02-21 RX ADMIN — DEXAMETHASONE SODIUM PHOSPHATE 10 MG: 4 INJECTION, SOLUTION INTRAMUSCULAR; INTRAVENOUS at 12:58

## 2024-02-21 ASSESSMENT — PAIN SCALES - GENERAL: PAINLEVEL: NO PAIN (0)

## 2024-02-21 NOTE — NURSING NOTE
"Chief Complaint   Patient presents with    Cough     Cough present since February 5th. Patient states he has passed out 5-6 times from coughing so hard.         Initial /88 (BP Location: Right arm, Patient Position: Sitting, Cuff Size: Adult Regular)   Pulse 58   Temp 98.5  F (36.9  C) (Temporal)   Resp 18   Ht 1.702 m (5' 7\")   Wt 98.9 kg (218 lb)   SpO2 100%   BMI 34.14 kg/m   Estimated body mass index is 34.14 kg/m  as calculated from the following:    Height as of this encounter: 1.702 m (5' 7\").    Weight as of this encounter: 98.9 kg (218 lb).       Maricarmen Moura     "

## 2024-02-21 NOTE — PROGRESS NOTES
ASSESSMENT/PLAN:    I have reviewed the nursing notes.  I have reviewed the findings, diagnosis, plan and need for follow up with the patient.    1. Persistent cough  2. Wheezing  3. Shortness of breath  4. Viral URI with cough    -Patient declining strep and influenza testing at this time just wants reassurance that he has not developed pneumonia as he has history of pneumonia from October 2023.    - XR Chest 2 Views-clear chest x-ray    - albuterol (PROAIR HFA/PROVENTIL HFA/VENTOLIN HFA) 108 (90 Base) MCG/ACT inhaler; Inhale 2 puffs into the lungs every 6 hours as needed  Dispense: 18 g; Refill: 0    - dexAMETHasone (DECADRON) injectable solution used ORALLY 10 mg  - predniSONE (DELTASONE) 20 MG tablet; Take 3 tabs by mouth daily x 3 days, then 2 tabs daily x 3 days, then 1 tab daily x 3 days, then 1/2 tab daily x 3 days.  Dispense: 20 tablet; Refill: 0-dose given in the clinic    - benzonatate (TESSALON) 100 MG capsule; Take 1 capsule (100 mg) by mouth 3 times daily as needed for cough  Dispense: 90 capsule; Refill: 0    4. Viral URI with cough    - Discussed with patient viral vs bacterial respiratory illness, and evidence based practice and guidelines for cough, wheezing, shortness of breath without fever or infiltrate on xray are not indicative of pneumonia or bacterial illness and should not be treated with antibiotics.      - Discussed with patient that symptoms and exam are consistent with viral illness.  Discussed that symptomatic treatment of cough, wheezing and shortness of breath is appropriate but not with antibiotics.      - Symptomatic treatment - Encouraged fluids, salt water gargles, honey (only if greater than 1 year in age due to risk of botulism), elevation, humidifier, sinus rinse/netti pot, lozenges, tea, topical vapor rub, popsicles, rest, etc     - May use over-the-counter Tylenol or ibuprofen PRN    - Discussed warning signs/symptoms indicative of need to f/u    - Follow up if symptoms  persist or worsen or concerns    - I explained my diagnostic considerations and recommendations to the patient, who voiced understanding and agreement with the treatment plan. All questions were answered. We discussed potential side effects of any prescribed or recommended therapies, as well as expectations for response to treatments.    ARIADNA Alcazar CNP  2/21/2024  12:13 PM    HPI:    Lucho Candelaria is a 39 year old male who [facility] for concerns of cough since 2/05/24.  Coughing up green and clear sputum at times, headaches, wheezing and shortness of breath.  Has almost passed out a few times from coughing so hard. Denies fever, sore throat, nausea, vomiting or diarrhea or otalgia.  Denies allergies. Patient states that he has mild asthma, not taking any medications and no current diagnosis for asthma.      History reviewed. No pertinent past medical history.  History reviewed. No pertinent surgical history.  Social History     Tobacco Use    Smoking status: Every Day     Packs/day: .5     Types: Cigarettes    Smokeless tobacco: Never   Substance Use Topics    Alcohol use: Yes     Alcohol/week: 3.0 standard drinks of alcohol     Types: 3 Standard drinks or equivalent per week     Current Outpatient Medications   Medication Sig Dispense Refill    albuterol (PROAIR HFA/PROVENTIL HFA/VENTOLIN HFA) 108 (90 Base) MCG/ACT inhaler Inhale 2 puffs into the lungs every 6 hours as needed 18 g 0    amLODIPine (NORVASC) 10 MG tablet Take one half tab daily      aspirin (GOODSENSE ASPIRIN) 325 MG tablet Take 325 mg by mouth daily with food      atenolol (TENORMIN) 50 MG tablet Take 50 mg by mouth daily      fenofibrate 160 MG tablet Take  by mouth once daily with a meal.      ibuprofen (ADVIL/MOTRIN) 800 MG tablet Take 800 mg by mouth every 6 hours as needed for pain      lisinopril-hydrochlorothiazide (ZESTORETIC) 10-12.5 MG tablet Take 1 tablet by mouth daily      omeprazole (RA OMEPRAZOLE) 20 MG  "tablet Take 20 mg by mouth daily      Potassium Bicarb-Citric Acid 10 MEQ TBEF Take  by mouth.       Allergies   Allergen Reactions    Adhesive Tape     Amoxicillin Itching and Rash     No hives but intense itching and redness, suspect Amoxicillin as cause     Past medical history, past surgical history, current medications and allergies reviewed and accurate to the best of my knowledge.      ROS:  Refer to HPI    /88 (BP Location: Right arm, Patient Position: Sitting, Cuff Size: Adult Regular)   Pulse 58   Temp 98.5  F (36.9  C) (Temporal)   Resp 18   Ht 1.702 m (5' 7\")   Wt 98.9 kg (218 lb)   SpO2 100%   BMI 34.14 kg/m      EXAM:  General Appearance: Well appearing 39 year old male, appropriate appearance for age. No acute distress   Ears: Left TM intact, translucent with bony landmarks appreciated, + erythema, no effusion, no bulging, no purulence.  Right TM intact, translucent with bony landmarks appreciated, + erythema, no effusion, no bulging, no purulence.  Left auditory canal clear.  Right auditory canal clear.  Normal external ears, non tender.  Eyes: conjunctivae normal without erythema or irritation, corneas clear, no drainage or crusting, no eyelid swelling, pupils equal   Oropharynx: moist mucous membranes, posterior pharynx with erythema,  post nasal drip seen, no trismus, voice clear.    Sinuses:  No sinus tenderness upon palpation of the frontal or maxillary sinuses  Nose:  Bilateral nares: no erythema, no edema, drainage and congestion   Neck: supple without adenopathy  Respiratory: normal chest wall and respirations.  Normal effort.  Adventitious sounds noted in left lobe. Clear to auscultation right lobe.  No increased work of breathing.  Harsh non productive cough appreciated.  Cardiac: RRR with no murmurs  Abdomen: soft, nontender, no rigidity, no rebound tenderness or guarding, normal bowel sounds present    Musculoskeletal:  Equal movement of bilateral upper extremities.  Equal " movement of bilateral lower extremities.  Normal gait.    Neuro: Alert and oriented to person, place, and time.   Psychological: normal affect, alert, oriented, and pleasant.     Xray:  Results for orders placed or performed in visit on 02/21/24   XR Chest 2 Views     Status: None    Narrative    Exam:  XR CHEST 2 VIEWS    HISTORY: Persistent cough; Wheezing; Shortness of breath.    COMPARISON:  10/2/2023    FINDINGS:     The cardiomediastinal contours are normal.      No focal consolidation, effusion, or pneumothorax.      No acute osseous abnormality.       Impression    IMPRESSION:      No acute cardiopulmonary process.      KEN WHITTAKER MD         SYSTEM ID:  [id number]

## 2024-07-06 ENCOUNTER — HEALTH MAINTENANCE LETTER (OUTPATIENT)
Age: 39
End: 2024-07-06

## 2024-10-17 ENCOUNTER — APPOINTMENT (OUTPATIENT)
Dept: CT IMAGING | Facility: OTHER | Age: 39
End: 2024-10-17
Attending: STUDENT IN AN ORGANIZED HEALTH CARE EDUCATION/TRAINING PROGRAM
Payer: COMMERCIAL

## 2024-10-17 ENCOUNTER — APPOINTMENT (OUTPATIENT)
Dept: GENERAL RADIOLOGY | Facility: OTHER | Age: 39
End: 2024-10-17
Attending: STUDENT IN AN ORGANIZED HEALTH CARE EDUCATION/TRAINING PROGRAM
Payer: COMMERCIAL

## 2024-10-17 ENCOUNTER — HOSPITAL ENCOUNTER (EMERGENCY)
Facility: OTHER | Age: 39
Discharge: HOME | End: 2024-10-17
Attending: STUDENT IN AN ORGANIZED HEALTH CARE EDUCATION/TRAINING PROGRAM | Admitting: STUDENT IN AN ORGANIZED HEALTH CARE EDUCATION/TRAINING PROGRAM
Payer: COMMERCIAL

## 2024-10-17 ENCOUNTER — OFFICE VISIT (OUTPATIENT)
Dept: FAMILY MEDICINE | Facility: OTHER | Age: 39
End: 2024-10-17
Attending: CHIROPRACTOR
Payer: COMMERCIAL

## 2024-10-17 VITALS
SYSTOLIC BLOOD PRESSURE: 130 MMHG | HEART RATE: 108 BPM | TEMPERATURE: 104.5 F | DIASTOLIC BLOOD PRESSURE: 82 MMHG | BODY MASS INDEX: 33.68 KG/M2 | RESPIRATION RATE: 24 BRPM | WEIGHT: 214.6 LBS | OXYGEN SATURATION: 93 % | HEIGHT: 67 IN

## 2024-10-17 VITALS
OXYGEN SATURATION: 93 % | BODY MASS INDEX: 33.59 KG/M2 | WEIGHT: 214 LBS | RESPIRATION RATE: 18 BRPM | SYSTOLIC BLOOD PRESSURE: 100 MMHG | TEMPERATURE: 98.9 F | DIASTOLIC BLOOD PRESSURE: 61 MMHG | HEART RATE: 73 BPM | HEIGHT: 67 IN

## 2024-10-17 DIAGNOSIS — R05.9 COUGH, UNSPECIFIED TYPE: ICD-10-CM

## 2024-10-17 DIAGNOSIS — R19.7 DIARRHEA, UNSPECIFIED TYPE: ICD-10-CM

## 2024-10-17 DIAGNOSIS — J18.9 PNEUMONIA OF BOTH LUNGS DUE TO INFECTIOUS ORGANISM, UNSPECIFIED PART OF LUNG: ICD-10-CM

## 2024-10-17 DIAGNOSIS — R50.9 FEVER, UNSPECIFIED FEVER CAUSE: Primary | ICD-10-CM

## 2024-10-17 DIAGNOSIS — K92.1 BLOODY STOOL: ICD-10-CM

## 2024-10-17 LAB
ALBUMIN SERPL BCG-MCNC: 4.2 G/DL (ref 3.5–5.2)
ALP SERPL-CCNC: 54 U/L (ref 40–150)
ALT SERPL W P-5'-P-CCNC: 31 U/L (ref 0–70)
ANION GAP SERPL CALCULATED.3IONS-SCNC: 10 MMOL/L (ref 7–15)
AST SERPL W P-5'-P-CCNC: 46 U/L (ref 0–45)
BASE EXCESS BLDV CALC-SCNC: 3.7 MMOL/L (ref -3–3)
BASOPHILS # BLD AUTO: 0.1 10E3/UL (ref 0–0.2)
BASOPHILS NFR BLD AUTO: 1 %
BILIRUB SERPL-MCNC: 0.5 MG/DL
BUN SERPL-MCNC: 15.3 MG/DL (ref 6–20)
CALCIUM SERPL-MCNC: 8.7 MG/DL (ref 8.8–10.4)
CHLORIDE SERPL-SCNC: 98 MMOL/L (ref 98–107)
CREAT SERPL-MCNC: 1.49 MG/DL (ref 0.67–1.17)
CRP SERPL-MCNC: 193.55 MG/L
EGFRCR SERPLBLD CKD-EPI 2021: 60 ML/MIN/1.73M2
EOSINOPHIL # BLD AUTO: 0.1 10E3/UL (ref 0–0.7)
EOSINOPHIL NFR BLD AUTO: 1 %
ERYTHROCYTE [DISTWIDTH] IN BLOOD BY AUTOMATED COUNT: 13.4 % (ref 10–15)
FLUAV RNA SPEC QL NAA+PROBE: NEGATIVE
FLUBV RNA RESP QL NAA+PROBE: NEGATIVE
GLUCOSE SERPL-MCNC: 116 MG/DL (ref 70–99)
HCO3 BLDV-SCNC: 28 MMOL/L (ref 21–28)
HCO3 SERPL-SCNC: 25 MMOL/L (ref 22–29)
HCT VFR BLD AUTO: 35.8 % (ref 40–53)
HGB BLD-MCNC: 12.2 G/DL (ref 13.3–17.7)
HOLD SPECIMEN: NORMAL
IMM GRANULOCYTES # BLD: 0 10E3/UL
IMM GRANULOCYTES NFR BLD: 1 %
LACTATE SERPL-SCNC: 0.7 MMOL/L (ref 0.7–2)
LIPASE SERPL-CCNC: 50 U/L (ref 13–60)
LYMPHOCYTES # BLD AUTO: 0.9 10E3/UL (ref 0.8–5.3)
LYMPHOCYTES NFR BLD AUTO: 11 %
MCH RBC QN AUTO: 30.1 PG (ref 26.5–33)
MCHC RBC AUTO-ENTMCNC: 34.1 G/DL (ref 31.5–36.5)
MCV RBC AUTO: 88 FL (ref 78–100)
MONOCYTES # BLD AUTO: 0.8 10E3/UL (ref 0–1.3)
MONOCYTES NFR BLD AUTO: 10 %
NEUTROPHILS # BLD AUTO: 6.5 10E3/UL (ref 1.6–8.3)
NEUTROPHILS NFR BLD AUTO: 77 %
NRBC # BLD AUTO: 0 10E3/UL
NRBC BLD AUTO-RTO: 0 /100
O2/TOTAL GAS SETTING VFR VENT: 21 %
OXYHGB MFR BLDV: 31 % (ref 70–75)
PCO2 BLDV: 39 MM HG (ref 40–50)
PH BLDV: 7.46 [PH] (ref 7.32–7.43)
PLATELET # BLD AUTO: 217 10E3/UL (ref 150–450)
PO2 BLDV: 19 MM HG (ref 25–47)
POTASSIUM SERPL-SCNC: 3.8 MMOL/L (ref 3.4–5.3)
PROCALCITONIN SERPL IA-MCNC: 0.21 NG/ML
PROT SERPL-MCNC: 7.3 G/DL (ref 6.4–8.3)
RBC # BLD AUTO: 4.05 10E6/UL (ref 4.4–5.9)
RSV RNA SPEC NAA+PROBE: NEGATIVE
SAO2 % BLDV: 31.8 % (ref 70–75)
SARS-COV-2 RNA RESP QL NAA+PROBE: NEGATIVE
SODIUM SERPL-SCNC: 133 MMOL/L (ref 135–145)
TROPONIN T SERPL HS-MCNC: 7 NG/L
WBC # BLD AUTO: 8.4 10E3/UL (ref 4–11)

## 2024-10-17 PROCEDURE — 74177 CT ABD & PELVIS W/CONTRAST: CPT

## 2024-10-17 PROCEDURE — 83690 ASSAY OF LIPASE: CPT | Performed by: STUDENT IN AN ORGANIZED HEALTH CARE EDUCATION/TRAINING PROGRAM

## 2024-10-17 PROCEDURE — 99285 EMERGENCY DEPT VISIT HI MDM: CPT | Mod: 25 | Performed by: STUDENT IN AN ORGANIZED HEALTH CARE EDUCATION/TRAINING PROGRAM

## 2024-10-17 PROCEDURE — 250N000011 HC RX IP 250 OP 636: Performed by: STUDENT IN AN ORGANIZED HEALTH CARE EDUCATION/TRAINING PROGRAM

## 2024-10-17 PROCEDURE — 83605 ASSAY OF LACTIC ACID: CPT | Performed by: STUDENT IN AN ORGANIZED HEALTH CARE EDUCATION/TRAINING PROGRAM

## 2024-10-17 PROCEDURE — 87040 BLOOD CULTURE FOR BACTERIA: CPT | Performed by: STUDENT IN AN ORGANIZED HEALTH CARE EDUCATION/TRAINING PROGRAM

## 2024-10-17 PROCEDURE — 84145 PROCALCITONIN (PCT): CPT | Performed by: STUDENT IN AN ORGANIZED HEALTH CARE EDUCATION/TRAINING PROGRAM

## 2024-10-17 PROCEDURE — 80053 COMPREHEN METABOLIC PANEL: CPT | Performed by: STUDENT IN AN ORGANIZED HEALTH CARE EDUCATION/TRAINING PROGRAM

## 2024-10-17 PROCEDURE — 96365 THER/PROPH/DIAG IV INF INIT: CPT | Mod: XU | Performed by: STUDENT IN AN ORGANIZED HEALTH CARE EDUCATION/TRAINING PROGRAM

## 2024-10-17 PROCEDURE — 36415 COLL VENOUS BLD VENIPUNCTURE: CPT | Performed by: STUDENT IN AN ORGANIZED HEALTH CARE EDUCATION/TRAINING PROGRAM

## 2024-10-17 PROCEDURE — 250N000013 HC RX MED GY IP 250 OP 250 PS 637: Performed by: STUDENT IN AN ORGANIZED HEALTH CARE EDUCATION/TRAINING PROGRAM

## 2024-10-17 PROCEDURE — 87637 SARSCOV2&INF A&B&RSV AMP PRB: CPT | Performed by: STUDENT IN AN ORGANIZED HEALTH CARE EDUCATION/TRAINING PROGRAM

## 2024-10-17 PROCEDURE — 99284 EMERGENCY DEPT VISIT MOD MDM: CPT | Performed by: STUDENT IN AN ORGANIZED HEALTH CARE EDUCATION/TRAINING PROGRAM

## 2024-10-17 PROCEDURE — 258N000003 HC RX IP 258 OP 636: Performed by: STUDENT IN AN ORGANIZED HEALTH CARE EDUCATION/TRAINING PROGRAM

## 2024-10-17 PROCEDURE — 82805 BLOOD GASES W/O2 SATURATION: CPT | Performed by: STUDENT IN AN ORGANIZED HEALTH CARE EDUCATION/TRAINING PROGRAM

## 2024-10-17 PROCEDURE — 96366 THER/PROPH/DIAG IV INF ADDON: CPT | Performed by: STUDENT IN AN ORGANIZED HEALTH CARE EDUCATION/TRAINING PROGRAM

## 2024-10-17 PROCEDURE — 85004 AUTOMATED DIFF WBC COUNT: CPT | Performed by: STUDENT IN AN ORGANIZED HEALTH CARE EDUCATION/TRAINING PROGRAM

## 2024-10-17 PROCEDURE — 71045 X-RAY EXAM CHEST 1 VIEW: CPT

## 2024-10-17 PROCEDURE — 99207 PR NO CHARGE LOS: CPT | Mod: 25 | Performed by: STUDENT IN AN ORGANIZED HEALTH CARE EDUCATION/TRAINING PROGRAM

## 2024-10-17 PROCEDURE — 250N000009 HC RX 250: Performed by: STUDENT IN AN ORGANIZED HEALTH CARE EDUCATION/TRAINING PROGRAM

## 2024-10-17 PROCEDURE — 96375 TX/PRO/DX INJ NEW DRUG ADDON: CPT | Performed by: STUDENT IN AN ORGANIZED HEALTH CARE EDUCATION/TRAINING PROGRAM

## 2024-10-17 PROCEDURE — 93005 ELECTROCARDIOGRAM TRACING: CPT | Performed by: STUDENT IN AN ORGANIZED HEALTH CARE EDUCATION/TRAINING PROGRAM

## 2024-10-17 PROCEDURE — 86140 C-REACTIVE PROTEIN: CPT | Performed by: STUDENT IN AN ORGANIZED HEALTH CARE EDUCATION/TRAINING PROGRAM

## 2024-10-17 PROCEDURE — 93010 ELECTROCARDIOGRAM REPORT: CPT | Performed by: INTERNAL MEDICINE

## 2024-10-17 PROCEDURE — 84484 ASSAY OF TROPONIN QUANT: CPT | Performed by: STUDENT IN AN ORGANIZED HEALTH CARE EDUCATION/TRAINING PROGRAM

## 2024-10-17 PROCEDURE — 96361 HYDRATE IV INFUSION ADD-ON: CPT | Performed by: STUDENT IN AN ORGANIZED HEALTH CARE EDUCATION/TRAINING PROGRAM

## 2024-10-17 RX ORDER — KETOROLAC TROMETHAMINE 30 MG/ML
30 INJECTION, SOLUTION INTRAMUSCULAR; INTRAVENOUS ONCE
Status: COMPLETED | OUTPATIENT
Start: 2024-10-17 | End: 2024-10-17

## 2024-10-17 RX ORDER — OFLOXACIN 3 MG/ML
SOLUTION/ DROPS OPHTHALMIC
COMMUNITY
Start: 2024-02-13

## 2024-10-17 RX ORDER — CEFDINIR 300 MG/1
300 CAPSULE ORAL 2 TIMES DAILY
Qty: 20 CAPSULE | Refills: 0 | Status: SHIPPED | OUTPATIENT
Start: 2024-10-17 | End: 2024-10-27

## 2024-10-17 RX ORDER — TOBRAMYCIN AND DEXAMETHASONE 3; 1 MG/ML; MG/ML
SUSPENSION/ DROPS OPHTHALMIC
COMMUNITY
Start: 2024-02-20

## 2024-10-17 RX ORDER — CEFTRIAXONE 2 G/1
2 INJECTION, POWDER, FOR SOLUTION INTRAMUSCULAR; INTRAVENOUS EVERY 24 HOURS
Status: DISCONTINUED | OUTPATIENT
Start: 2024-10-17 | End: 2024-10-17 | Stop reason: HOSPADM

## 2024-10-17 RX ORDER — ACETAMINOPHEN 500 MG
1000 TABLET ORAL ONCE
Status: COMPLETED | OUTPATIENT
Start: 2024-10-17 | End: 2024-10-17

## 2024-10-17 RX ORDER — ACETAMINOPHEN 325 MG/1
650 TABLET ORAL ONCE
Status: DISCONTINUED | OUTPATIENT
Start: 2024-10-17 | End: 2024-10-17

## 2024-10-17 RX ORDER — DOXYCYCLINE 100 MG/1
100 CAPSULE ORAL 2 TIMES DAILY
Qty: 20 CAPSULE | Refills: 0 | Status: SHIPPED | OUTPATIENT
Start: 2024-10-17 | End: 2024-10-27

## 2024-10-17 RX ORDER — IOPAMIDOL 755 MG/ML
100 INJECTION, SOLUTION INTRAVASCULAR ONCE
Status: COMPLETED | OUTPATIENT
Start: 2024-10-17 | End: 2024-10-17

## 2024-10-17 RX ORDER — DOXYCYCLINE 100 MG/1
100 CAPSULE ORAL ONCE
Status: COMPLETED | OUTPATIENT
Start: 2024-10-17 | End: 2024-10-17

## 2024-10-17 RX ADMIN — SODIUM CHLORIDE 60 ML: 9 INJECTION, SOLUTION INTRAVENOUS at 19:03

## 2024-10-17 RX ADMIN — ACETAMINOPHEN 1000 MG: 500 TABLET, FILM COATED ORAL at 17:45

## 2024-10-17 RX ADMIN — CEFTRIAXONE 2 G: 2 INJECTION, POWDER, FOR SOLUTION INTRAMUSCULAR; INTRAVENOUS at 19:23

## 2024-10-17 RX ADMIN — DOXYCYCLINE HYCLATE 100 MG: 100 CAPSULE ORAL at 19:23

## 2024-10-17 RX ADMIN — KETOROLAC TROMETHAMINE 30 MG: 30 INJECTION, SOLUTION INTRAMUSCULAR at 19:33

## 2024-10-17 RX ADMIN — SODIUM CHLORIDE 1000 ML: 9 INJECTION, SOLUTION INTRAVENOUS at 17:45

## 2024-10-17 RX ADMIN — IOPAMIDOL 123 ML: 755 INJECTION, SOLUTION INTRAVENOUS at 19:02

## 2024-10-17 RX ADMIN — PROCHLORPERAZINE EDISYLATE 5 MG: 5 INJECTION INTRAMUSCULAR; INTRAVENOUS at 19:33

## 2024-10-17 ASSESSMENT — ACTIVITIES OF DAILY LIVING (ADL)
ADLS_ACUITY_SCORE: 35

## 2024-10-17 ASSESSMENT — COLUMBIA-SUICIDE SEVERITY RATING SCALE - C-SSRS
2. HAVE YOU ACTUALLY HAD ANY THOUGHTS OF KILLING YOURSELF IN THE PAST MONTH?: NO
6. HAVE YOU EVER DONE ANYTHING, STARTED TO DO ANYTHING, OR PREPARED TO DO ANYTHING TO END YOUR LIFE?: NO
1. IN THE PAST MONTH, HAVE YOU WISHED YOU WERE DEAD OR WISHED YOU COULD GO TO SLEEP AND NOT WAKE UP?: NO

## 2024-10-17 ASSESSMENT — PAIN SCALES - GENERAL: PAINLEVEL: MODERATE PAIN (5)

## 2024-10-17 NOTE — PROGRESS NOTES
"Tri[facility] from: [facility]    DIAGNOSIS:   1. Fever, unspecified fever cause    2. Cough, unspecified type    3. Diarrhea, unspecified type    4. Bloody stool      Patient [facility] with to [facility] with a 2 to 3-week history of fever, cough, diarrhea, bloody stools.  He notes significant worsening of symptoms.  He has been significantly chilled all day with high heat at work, long sleeves.  He notes he has not been drinking much for fluids.  Dark urine.      Initial /82   Pulse 108   Temp (!) 104.5  F (40.3  C) (Tympanic)   Resp 24   Ht 1.702 m (5' 7\")   Wt 97.3 kg (214 lb 9.6 oz)   SpO2 93%   BMI 33.61 kg/m   Estimated body mass index is 33.61 kg/m  as calculated from the following:    Height as of this encounter: 1.702 m (5' 7\").    Weight as of this encounter: 97.3 kg (214 lb 9.6 oz).      Patient appears diaphoretic, tachypneic.    Patient will be transferred to higher level of care.  ER providers notified.        Kathy Silva PA-C                "

## 2024-10-17 NOTE — ED TRIAGE NOTES
" Pt presents to ED via wheel chair from [facility]. Pt c/o abd pain, cough, shortness of breath, and diarrhea that started 3 weeks ago. Pt also c/o fever that started 3 days ago. /63   Pulse 107   Temp (!) 104.1  F (40.1  C) (Tympanic)   Resp 20   Ht 1.702 m (5' 7\")   Wt 97.1 kg (214 lb)   SpO2 93%   BMI 33.52 kg/m         Triage Assessment (Adult)       Row Name 10/17/24 [id number]          Triage Assessment    Airway WDL WDL        Respiratory WDL    Respiratory WDL X;rhythm/pattern;cough     Rhythm/Pattern, Respiratory dyspnea upon exertion;shortness of breath     Cough Type dry        Skin Circulation/Temperature WDL    Skin Circulation/Temperature WDL X;temperature     Skin Temperature warm        Cardiac WDL    Cardiac WDL X;rhythm     Pulse Rate & Regularity tachycardic        Peripheral/Neurovascular WDL    Peripheral Neurovascular WDL WDL        Cognitive/Neuro/Behavioral WDL    Cognitive/Neuro/Behavioral WDL WDL                     "

## 2024-10-17 NOTE — PROGRESS NOTES
1.  Has the patient had a previous reaction to IV contrast? No    2.  Does the patient have kidney disease? Yes gfr 60 today    3.  Is the patient on dialysis? No    If YES to any of these questions, exam will be reviewed with a Radiologist before administering contrast.    IV Contrast- Discharge Instructions After Your CT Scan      The IV contrast you received today will be filtered from your bloodstream by your kidneys during the next 24 hours and pass from the body in urine.  You will not be aware of this process and your urine will not change in color.  To help this process you should drink at least 4 additional glasses of water or juice today.  This reduces stress on your kidneys.    Most contrast reactions are immediate.  Should you develop symptoms of concern after discharge, contact the department at the number below.  After hours you should contact your personal physician.  If you develop breathing distress or wheezing, call 911.

## 2024-10-17 NOTE — NURSING NOTE
"Chief Complaint   Patient presents with    Diarrhea     On and off x3 weeks    Headache     pressure    Cough    Shortness of Breath    Fever     Patient here for diarrhea, lack of appetite and drinking, chills, coughing, SOB, fever, and head pressure on and off x3 weeks. Has treated with tylenol and saundra selter.     Initial /82   Pulse 108   Temp (!) 104.5  F (40.3  C) (Tympanic)   Resp 24   Ht 1.702 m (5' 7\")   Wt 97.3 kg (214 lb 9.6 oz)   SpO2 93%   BMI 33.61 kg/m   Estimated body mass index is 33.61 kg/m  as calculated from the following:    Height as of this encounter: 1.702 m (5' 7\").    Weight as of this encounter: 97.3 kg (214 lb 9.6 oz).  Medication Review: complete    The next two questions are to help us understand your food security.  If you are feeling you need any assistance in this area, we have resources available to support you today.          10/17/2024   SDOH- Food Insecurity   Within the past 12 months, did you worry that your food would run out before you got money to buy more? N   Within the past 12 months, did the food you bought just not last and you didn t have money to get more? N            Health Care Directive:  Patient does not have a Health Care Directive or Living Will: Discussed advance care planning with patient; however, patient declined at this time.    Kelly Menard LPN      "

## 2024-10-17 NOTE — ED PROVIDER NOTES
"ED PROVIDER NOTE  Patient Name: Lucho Candelaria  MRN: [id number]    CC:    Chief Complaint   Patient presents with    Abdominal Pain    Fever    Shortness of Breath         MDM  39 year old male presenting with abdominal pain, cough, fever, shortness of breath.    In the ED, /63   Pulse 107   Temp (!) 104.1  F (40.1  C) (Tympanic)   Resp 20   Ht 1.702 m (5' 7\")   Wt 97.1 kg (214 lb)   SpO2 93%   BMI 33.52 kg/m      Physical exam revealed clear auscultation bilaterally.  Benign abdominal exam.  Grossly neurologically intact.  In no acute distress, no accessory muscle use.  Overall does not look critically ill or toxic.      I have independently reviewed and interpreted the patients test results which I have ordered this visit which are the following:      ED Course as of 10/17/24 1928   Thu Oct 17, 2024   1811 WBC: 8.4   1811 Hemoglobin(!): 12.2   1811 EKG which is interpreted by me reveals some slight ST depressions in lead V3, through V5. No STEMI. Rate 94   1812 Ph Venous(!): 7.46   1812 PCO2 Venous(!): 39   1812 Bicarbonate Venous: 28   1825 CRP Inflammation(!): 193.55   1825 Troponin T, High Sensitivity: 7   1825 Lipase: 50   1825 Sodium(!): 133   1825 Potassium: 3.8   1825 Creatinine(!): 1.49   1825 GFR Estimate(!): 60   1825 Calcium(!): 8.7   1831 Lactic Acid: 0.7   1831 Procalcitonin: 0.21   1831 Xr chest  IMPRESSION:  No focal consolidation.     1835 Influenza A: Negative   1835 Influenza B: Negative   1835 Resp Syncytial Virus: Negative   1835 SARS CoV2 PCR: Negative         Pneumonia, bilateral lower  Abdominal pain, lower  Patient spouse also has similar URI symptoms, patient presents for cough that is productive with fevers chills vomiting and diarrhea.  No recent travel.  Denies any extremity swelling calf tenderness or pleuritic pain.  Denies any chest pain.  Ultimately, I believe his symptoms are secondary to bilateral lower lobe pneumonia which was seen on the CT scan of his abdomen.  He " was started on antibiotics for CAP.   - COVID influenza RSV negative  - blood culture pending  - 1L NS  - tylenol 1000 mg PO  - toradol 30 mg IV  - compazine 5 mg IV  - ceftriaxone 2 g IV and doxycycline 100 mg PO  - CT abdomen pelvis pending  - awaiting reassessment; suspect he will improve for discontinue, but may need obs stay. See my partners note for further plan and cares      Clinical impression  Bilateral lower lobe pneumonia    Disposition  Signed out to my partner      HPI    Lucho Candelaria is a 39 year old male with PMH of ACS 2/2 cocaine, HTN, inguinal hernia who presents with abdominal pain.     History of obtained by: patient    Patient presents with few days of cough, lower quadrant abdominal pain, 1 episode of nausea and nonbloody vomiting, multiple episodes of nonbloody stool.  Sore throat, fevers, myalgias and chills.  He states his partner also has similar symptoms of a cough and bronchitis.  He denies any chest pain or shortness of breath.  He denies any alcohol smoking or drug use.  Denies any orthopnea PND extremity swelling or calf tenderness.    PMH and SH reviewed.    10 point ROS reviewed and negative except as stated in HPI.    Past medical history:  No past medical history on file.    Social history:  Social Connections: Not on file     Social History     Socioeconomic History    Marital status:    Tobacco Use    Smoking status: Every Day     Current packs/day: 0.50     Types: Cigarettes    Smokeless tobacco: Never   Vaping Use    Vaping status: Never Used   Substance and Sexual Activity    Alcohol use: Yes     Alcohol/week: 3.0 standard drinks of alcohol     Types: 3 Standard drinks or equivalent per week    Drug use: Never   Social History Narrative    **pre upload 01/03/2013**     Social Determinants of Health     Food Insecurity: Low Risk  (10/17/2024)    Food Insecurity     Within the past 12 months, did you worry that your food would run out before you got money to buy more?:  No     Within the past 12 months, did the food you bought just not last and you didn t have money to get more?: No   Interpersonal Safety: Low Risk  (10/17/2024)    Interpersonal Safety     Do you feel physically and emotionally safe where you currently live?: Yes     Within the past 12 months, have you been hit, slapped, kicked or otherwise physically hurt by someone?: No     Within the past 12 months, have you been humiliated or emotionally abused in other ways by your partner or ex-partner?: No         I have reviewed the patients prescribed medications:  No current facility-administered medications for this encounter.    Current Outpatient Medications:     albuterol (PROAIR HFA/PROVENTIL HFA/VENTOLIN HFA) 108 (90 Base) MCG/ACT inhaler, Inhale 2 puffs into the lungs every 6 hours as needed, Disp: 18 g, Rfl: 0    amLODIPine (NORVASC) 10 MG tablet, Take one half tab daily, Disp: , Rfl:     aspirin (GOODSENSE ASPIRIN) 325 MG tablet, Take 325 mg by mouth daily with food, Disp: , Rfl:     atenolol (TENORMIN) 50 MG tablet, Take 50 mg by mouth daily, Disp: , Rfl:     fenofibrate 160 MG tablet, Take  by mouth once daily with a meal., Disp: , Rfl:     ibuprofen (ADVIL/MOTRIN) 800 MG tablet, Take 800 mg by mouth every 6 hours as needed for pain, Disp: , Rfl:     lisinopril-hydrochlorothiazide (ZESTORETIC) 10-12.5 MG tablet, Take 1 tablet by mouth daily, Disp: , Rfl:     ofloxacin (OCUFLOX) 0.3 % ophthalmic solution, INSTILL 1 DROP 4 TIMES DAILY INTO EACH EYE FOR 7 DAYS, Disp: , Rfl:     omeprazole (RA OMEPRAZOLE) 20 MG tablet, Take 20 mg by mouth daily, Disp: , Rfl:     Potassium Bicarb-Citric Acid 10 MEQ TBEF, Take  by mouth., Disp: , Rfl:     predniSONE (DELTASONE) 20 MG tablet, Take 3 tabs by mouth daily x 3 days, then 2 tabs daily x 3 days, then 1 tab daily x 3 days, then 1/2 tab daily x 3 days., Disp: 20 tablet, Rfl: 0    tobramycin-dexAMETHasone (TOBRADEX) 0.3-0.1 % ophthalmic suspension, INSTILL 1 DROP 4 TIMES DAILY  "INTO LEFT EYE FOR 7 DAYS, Disp: , Rfl:     Allergies:  Allergies   Allergen Reactions    Adhesive Tape     Amoxicillin Itching and Rash     No hives but intense itching and redness, suspect Amoxicillin as cause         Vitals:    10/17/24 1722   BP: 137/63   Pulse: 107   Resp: 20   Temp: (!) 104.1  F (40.1  C)   TempSrc: Tympanic   SpO2: 93%   Weight: 97.1 kg (214 lb)   Height: 1.702 m (5' 7\")       Physical Exam  Vitals: /63   Pulse 107   Temp (!) 104.1  F (40.1  C) (Tympanic)   Resp 20   Ht 1.702 m (5' 7\")   Wt 97.1 kg (214 lb)   SpO2 93%   BMI 33.52 kg/m    Constitutional: awake, NAD  Eyes: No conjunctival injection and normal lids, PERRL, EOMI  ENT: external nose and ears atraumatic  Neck: Symmetric, trachea midline, Supple  CV: RRR, no murmurs appreciated.  Pulm: Unlabored respiratory effort, good air movement, CTAB, no w/c/r appreciated.  GI: Soft, nontender, nondistended.  No rebound or guarding  MSK: No deformities.  No cyanosis.  Neuro: AOx4, normal speech, following commands, grossly symmetric strength in all extremities.  Cranial nerves III-XII grossly intact.    Skin: warm & dry, no rashes or lesions  Psych: Appropriate mood & affect    Past medical history, past surgical history, problem list, family history, social history, medication list, and allergies were reviewed as documented in epic snapshot.  Relevant review of systems are documented within the HPI above.    Complexity of the Problems Addressed  5 (High) - 1 or more chronic illnesses with severe exacerbation, progression, or side effects of treatment or 1 acute chronic illness or injury that poses threat to live or bodily function    Complexity of Data  I have reviewed the following pertinent historical labs, diagnoses, tests, and/or imaging which contribute to complexity of this patient's care.    5 - (Extensive) - (2 of three above)    Complication Risk  Based on my interpretation of the current labs, historical tests and/or " external tests. Patient does have a risk level as stated below of morbidity, threat to life, and bodily function, and severe exacerbation if not treated.  5 - High (drug therapy requiring intensive monitoring, elective major surgery with risk factors, emergency major surgery, hospitalization or excalation of hospital level care, decision not to resuscitate or to de-escalate care because of poor prognosis, parenteral controlled substances)      ED Events, Labs and Imaging:  ED Course as of 10/17/24 1928   Thu Oct 17, 2024   1811 WBC: 8.4   1811 Hemoglobin(!): 12.2   1811 EKG which is interpreted by me reveals some slight ST depressions in lead V3, through V5. No STEMI. Rate 94   1812 Ph Venous(!): 7.46   1812 PCO2 Venous(!): 39   1812 Bicarbonate Venous: 28   1825 CRP Inflammation(!): 193.55   1825 Troponin T, High Sensitivity: 7   1825 Lipase: 50   1825 Sodium(!): 133   1825 Potassium: 3.8   1825 Creatinine(!): 1.49   1825 GFR Estimate(!): 60   1825 Calcium(!): 8.7   1831 Lactic Acid: 0.7   1831 Procalcitonin: 0.21   1831 Xr chest  IMPRESSION:  No focal consolidation.     1835 Influenza A: Negative   1835 Influenza B: Negative   1835 Resp Syncytial Virus: Negative   1835 SARS CoV2 PCR: Negative       Leno Diaz MD  Emergency Medicine    Dictation Disclaimer: Some notes are completed with voice-recognition dictation software. Errors are generally corrected in real time. Please contact me via Epic staff message if you note any errors requiring clarification.     Payam Diaz MD  10/17/24 1942

## 2024-10-17 NOTE — LETTER
October 17, 2024      To Whom It May Concern:      Lucho Candelaria was seen in our Emergency Department today, 10/17/24.  I expect his condition to improve over the next *** days.  He may return to work when improved.    Sincerely,        MD Rosy

## 2024-10-17 NOTE — Clinical Note
Lucho Candelaria was seen and treated in our emergency department on 10/17/2024.  He may return to work on 10/20/2024.       If you have any questions or concerns, please don't hesitate to call.      Steve Booker MD

## 2024-10-18 LAB
ATRIAL RATE - MUSE: 94 BPM
DIASTOLIC BLOOD PRESSURE - MUSE: NORMAL MMHG
INTERPRETATION ECG - MUSE: NORMAL
P AXIS - MUSE: 30 DEGREES
PR INTERVAL - MUSE: 160 MS
QRS DURATION - MUSE: 90 MS
QT - MUSE: 316 MS
QTC - MUSE: 395 MS
R AXIS - MUSE: 42 DEGREES
SYSTOLIC BLOOD PRESSURE - MUSE: NORMAL MMHG
T AXIS - MUSE: 19 DEGREES
VENTRICULAR RATE- MUSE: 94 BPM

## 2024-10-18 NOTE — ED PROVIDER NOTES
10/17/24   8 PM    I am accepting the care of this patient from Dr Diaz at change of shift.  Lucho Candelaria is a 38 yo male who has pneumonia. He had labs, xray and CT done. He is on room air.     ED Course    8:53 PM  He feels well enough to go home.  Dr Diaz sent antibiotic prescription to pharmacy.     Diagnosis    (J18.9) Pneumonia of both lungs due to infectious organism, unspecified part of lung      Plan: discharge            Steve Booker MD  10/17/24 2053

## 2024-10-18 NOTE — DISCHARGE INSTRUCTIONS
Follow-up with your primary care doctor the next 2 to 3 days.    Take cefdinir for 10 days    Take doxycycline for 7 days    Drink plenty of fluids.     For mild to moderate pain or fever you can take ibuprofen and/or Tylenol if you do not have allergies to these.    You have any worsening or concerning symptoms please call 911 or return to the emergency department immediately.    Thank you for choosing our Emergency Department for your care.     You may receive a phone call or letter for a survey about your care in our ED.  Please complete this as this is how we improve care for our patients.     If you have any questions after leaving the ED you can call or text me on my cell phone at [phone].  I am not on call so if I do not answer my phone please leave a message- I will get back to you.  If you are not doing well please return to the ED.     Sincerely,    Dr Kush Booker M.D.  Medical Director  [facility] Emergency Department

## 2024-10-22 LAB — BACTERIA BLD CULT: NO GROWTH

## 2025-01-28 ENCOUNTER — OFFICE VISIT (OUTPATIENT)
Dept: FAMILY MEDICINE | Facility: OTHER | Age: 40
End: 2025-01-28
Attending: STUDENT IN AN ORGANIZED HEALTH CARE EDUCATION/TRAINING PROGRAM
Payer: COMMERCIAL

## 2025-01-28 VITALS
SYSTOLIC BLOOD PRESSURE: 130 MMHG | BODY MASS INDEX: 36.48 KG/M2 | RESPIRATION RATE: 16 BRPM | OXYGEN SATURATION: 99 % | HEIGHT: 66 IN | DIASTOLIC BLOOD PRESSURE: 78 MMHG | WEIGHT: 227 LBS | HEART RATE: 84 BPM | TEMPERATURE: 98.2 F

## 2025-01-28 DIAGNOSIS — J18.9 PNEUMONIA OF RIGHT LOWER LOBE DUE TO INFECTIOUS ORGANISM: Primary | ICD-10-CM

## 2025-01-28 RX ORDER — AZITHROMYCIN 250 MG/1
TABLET, FILM COATED ORAL
Qty: 6 TABLET | Refills: 0 | Status: SHIPPED | OUTPATIENT
Start: 2025-01-28 | End: 2025-02-02

## 2025-01-28 RX ORDER — CEFDINIR 300 MG/1
300 CAPSULE ORAL 2 TIMES DAILY
Qty: 10 CAPSULE | Refills: 0 | Status: SHIPPED | OUTPATIENT
Start: 2025-01-28 | End: 2025-02-02

## 2025-01-28 RX ORDER — PREDNISONE 20 MG/1
TABLET ORAL
Qty: 20 TABLET | Refills: 0 | Status: SHIPPED | OUTPATIENT
Start: 2025-01-28

## 2025-01-28 RX ORDER — BENZONATATE 200 MG/1
200 CAPSULE ORAL 3 TIMES DAILY PRN
Qty: 30 CAPSULE | Refills: 0 | Status: SHIPPED | OUTPATIENT
Start: 2025-01-28 | End: 2025-02-07

## 2025-01-28 RX ORDER — POTASSIUM CHLORIDE 750 MG/1
1 TABLET, EXTENDED RELEASE ORAL
COMMUNITY
Start: 2024-02-20

## 2025-01-28 ASSESSMENT — PAIN SCALES - GENERAL: PAINLEVEL_OUTOF10: MODERATE PAIN (5)

## 2025-01-28 NOTE — PATIENT INSTRUCTIONS
Pneumonia    Antibiotics. Probiotics.    Steroids.    Tessalon Perles.    Follow up with PCP as needed.  Return to [facility] if symptoms worsen or change.

## 2025-01-28 NOTE — NURSING NOTE
"Chief Complaint   Patient presents with    Cough     X 1 month    Throat Problem     Started after the cough - sharp pain in throat     Patient tx with otc cough meds  Sob x 1 month  Headache and cough til vomiting in the am or while lying down    Initial /78 (BP Location: Left arm, Patient Position: Sitting, Cuff Size: Adult Large)   Pulse 84   Temp 98.2  F (36.8  C) (Tympanic)   Resp 16   Ht 1.676 m (5' 6\")   Wt 103 kg (227 lb)   SpO2 99%   BMI 36.64 kg/m   Estimated body mass index is 36.64 kg/m  as calculated from the following:    Height as of this encounter: 1.676 m (5' 6\").    Weight as of this encounter: 103 kg (227 lb).     Advance Care Directive on file? n    FOOD SECURITY SCREENING QUESTIONS:    The next two questions are to help us understand your food security.  If you are feeling you need any assistance in this area, we have resources available to support you today.    Hunger Vital Signs:  Within the past 12 months we worried whether our food would run out before we got money to buy more. Never  Within the past 12 months the food we bought just didn't last and we didn't have money to get more. Never  Fiona Osei LPN,LPN on 1/28/2025 at 5:24 PM      Fiona Osei LPN     "

## 2025-01-29 NOTE — PROGRESS NOTES
"  Assessment & Plan     (J18.9) Pneumonia of right lower lobe due to infectious organism  (primary encounter diagnosis)    Comment: History and exam consistent with probable pneumonia.  Recent history of pneumonia making recurrence likely.  Vital signs are stable today.  Opted to defer x-ray imaging.    Plan: azithromycin (ZITHROMAX) 250 MG tablet,         cefdinir (OMNICEF) 300 MG capsule, predniSONE         (DELTASONE) 20 MG tablet, benzonatate         (TESSALON) 200 MG capsule      Plan to treat with antibiotics, steroid burst and taper, Tessalon Perles.  Continue over-the-counter management as needed.  Recommend follow-up with his PCP for further evaluation if symptoms do not improve.  Return to rapid clinic or ER if symptoms worsen or change in the meantime.  He is comfortable and agreeable with this plan.      Pilar Yepez is a 39 year old, presenting for the following health issues:  Cough (X 1 month) and Throat Problem (Started after the cough - sharp pain in throat)    HPI     Patient presents today with worsening cough.  He notes symptoms started about a month ago.  Over the past week, cough is significantly worsened, more productive, and chills/sweats during the overnight over the last couple of days.  He has been using over-the-counter management.  He also does note he did obtain a prescription for amoxicillin 500 mg, has been taking this twice a day.  He does note in October, did have pneumonia.      Review of Systems  Constitutional, HEENT, cardiovascular, pulmonary, gi and gu systems are negative, except as otherwise noted.        Objective    /78 (BP Location: Left arm, Patient Position: Sitting, Cuff Size: Adult Large)   Pulse 84   Temp 98.2  F (36.8  C) (Tympanic)   Resp 16   Ht 1.676 m (5' 6\")   Wt 103 kg (227 lb)   SpO2 99%   BMI 36.64 kg/m    Body mass index is 36.64 kg/m .    Physical Exam   GENERAL: alert and no distress  EYES: Eyes grossly normal to inspection, PERRL and " conjunctivae and sclerae normal  HENT: ear canals and TM's normal, nose and mouth without ulcers or lesions  NECK: no adenopathy, no asymmetry, masses, or scars  RESP: Slight crackles in the right lung base, otherwise clear to auscultation bilaterally  CV: regular rate and rhythm, normal S1 S2, no S3 or S4, no murmur, click or rub, no peripheral edema  MS: no gross musculoskeletal defects noted, no edema        Signed Electronically by: Kathy Silva PA-C

## 2025-06-18 ENCOUNTER — OFFICE VISIT (OUTPATIENT)
Dept: FAMILY MEDICINE | Facility: OTHER | Age: 40
End: 2025-06-18
Payer: COMMERCIAL

## 2025-06-18 ENCOUNTER — HOSPITAL ENCOUNTER (OUTPATIENT)
Dept: GENERAL RADIOLOGY | Facility: OTHER | Age: 40
Discharge: HOME | End: 2025-06-18
Attending: NURSE PRACTITIONER
Payer: COMMERCIAL

## 2025-06-18 VITALS
TEMPERATURE: 98.7 F | OXYGEN SATURATION: 97 % | BODY MASS INDEX: 34.84 KG/M2 | HEIGHT: 67 IN | WEIGHT: 222 LBS | DIASTOLIC BLOOD PRESSURE: 60 MMHG | HEART RATE: 76 BPM | SYSTOLIC BLOOD PRESSURE: 128 MMHG | RESPIRATION RATE: 20 BRPM

## 2025-06-18 DIAGNOSIS — Z87.01 HISTORY OF PNEUMONIA: ICD-10-CM

## 2025-06-18 DIAGNOSIS — R05.8 PRODUCTIVE COUGH: ICD-10-CM

## 2025-06-18 DIAGNOSIS — R06.02 SHORTNESS OF BREATH: ICD-10-CM

## 2025-06-18 DIAGNOSIS — J20.9 ACUTE BRONCHITIS WITH SYMPTOMS > 10 DAYS: Primary | ICD-10-CM

## 2025-06-18 PROCEDURE — 71046 X-RAY EXAM CHEST 2 VIEWS: CPT | Mod: 26 | Performed by: RADIOLOGY

## 2025-06-18 PROCEDURE — 71046 X-RAY EXAM CHEST 2 VIEWS: CPT

## 2025-06-18 PROCEDURE — 87449 NOS EACH ORGANISM AG IA: CPT | Mod: ZL | Performed by: NURSE PRACTITIONER

## 2025-06-18 RX ORDER — PREDNISONE 20 MG/1
TABLET ORAL
Qty: 12 TABLET | Refills: 0 | Status: SHIPPED | OUTPATIENT
Start: 2025-06-18 | End: 2025-06-25

## 2025-06-18 RX ORDER — AZITHROMYCIN 250 MG/1
TABLET, FILM COATED ORAL
Qty: 6 TABLET | Refills: 0 | Status: SHIPPED | OUTPATIENT
Start: 2025-06-18 | End: 2025-06-23

## 2025-06-18 ASSESSMENT — PAIN SCALES - GENERAL: PAINLEVEL_OUTOF10: SEVERE PAIN (8)

## 2025-06-18 NOTE — NURSING NOTE
"Chief Complaint   Patient presents with    Cough     X 2.5 - 3 weeks    Headache     When coughing - almost passes out     Patient has had pneumonia twice since October.  Patient is concerned with Blastomycosis - as he has been doing a lot of dirt work/roots/farm compost.    Initial /60 (BP Location: Right arm, Patient Position: Sitting, Cuff Size: Adult Regular)   Pulse 76   Temp 98.7  F (37.1  C) (Tympanic)   Resp 20   Ht 1.702 m (5' 7\")   Wt 100.7 kg (222 lb)   SpO2 97%   BMI 34.77 kg/m   Estimated body mass index is 34.77 kg/m  as calculated from the following:    Height as of this encounter: 1.702 m (5' 7\").    Weight as of this encounter: 100.7 kg (222 lb).     Advance Care Directive on file? n    FOOD SECURITY SCREENING QUESTIONS:    The next two questions are to help us understand your food security.  If you are feeling you need any assistance in this area, we have resources available to support you today.    Hunger Vital Signs:  Within the past 12 months we worried whether our food would run out before we got money to buy more. Never  Within the past 12 months the food we bought just didn't last and we didn't have money to get more. Never  Fiona Osei LPN,LPN on 6/18/2025 at 12:36 PM      Fiona Osei LPN     "

## 2025-06-18 NOTE — PROGRESS NOTES
ASSESSMENT/PLAN:     I have reviewed the nursing notes.  I have reviewed the findings, diagnosis, plan and need for follow up with the patient.        1. Productive cough  2. Shortness of breath  - XR Chest 2 Views  - Blastomyces Agn Quant EIA Non Blood    3. History of pneumonia  - XR Chest 2 Views  - Blastomyces Agn Quant EIA Non Blood    4. Acute bronchitis with symptoms > 10 days (Primary)  - azithromycin (ZITHROMAX) 250 MG tablet; Take 2 tablets (500 mg) by mouth daily for 1 day, THEN 1 tablet (250 mg) daily for 4 days.  Dispense: 6 tablet; Refill: 0  - predniSONE (DELTASONE) 20 MG tablet; Take 2 tablets (40 mg) by mouth daily for 5 days, THEN 1 tablet (20 mg) daily for 2 days.  Dispense: 12 tablet; Refill: 0    CXR completed and personally reviewed, no appreciated infiltrate, radiologist over read:  No acute cardiopulmonary process.   Urine blastomycoses testing pending    Symptomatic treatment - Encouraged fluids, salt water gargles, honey, elevation, humidifier, saline nasal spray, sinus rinse/netti pot, lozenges, tea, soup, smoothies, popsicles, topical vapor rub, rest, etc   May use over the counter cough/cold medication PRN  May use over-the-counter Tylenol or ibuprofen PRN    Discussed warning signs/symptoms indicative of need to f/u  Follow up if symptoms persist or worsen or concerns      I explained my diagnostic considerations and recommendations to the patient, who voiced understanding and agreement with the treatment plan. All questions were answered. We discussed potential side effects of any prescribed or recommended therapies, as well as expectations for response to treatments.    Tarah Pelaez NP  [facility]      SUBJECTIVE:   Lucho Candelaria is a 40 year old male who presents to clinic today for the following health issues:  Cough    HPI  Persistent hard frequent coughing for the past 3 weeks.  States he gets headaches during coughing and feels like he might pass out.   Throat pain during coughing.  Cough varies from dry to productive of clear and green phlegm.  Chest with intermittent tightness and heaviness with wheezing and shortness of breath.  NO fevers.  Some chills.   He has been working with soil and in the garden - concerned about possible blastomycoses.    Tried Vicks last night with improvement today.          No past medical history on file.  No past surgical history on file.  Social History     Tobacco Use    Smoking status: Former     Current packs/day: 0.00     Types: Cigarettes     Quit date: 2024     Years since quittin.2    Smokeless tobacco: Never   Substance Use Topics    Alcohol use: Yes     Alcohol/week: 3.0 standard drinks of alcohol     Types: 3 Standard drinks or equivalent per week     Current Outpatient Medications   Medication Sig Dispense Refill    albuterol (PROAIR HFA/PROVENTIL HFA/VENTOLIN HFA) 108 (90 Base) MCG/ACT inhaler Inhale 2 puffs into the lungs every 6 hours as needed 18 g 0    amLODIPine (NORVASC) 10 MG tablet Take one half tab daily      aspirin (GOODSENSE ASPIRIN) 325 MG tablet Take 325 mg by mouth daily with food      atenolol (TENORMIN) 50 MG tablet Take 50 mg by mouth daily      fenofibrate 160 MG tablet Take  by mouth once daily with a meal.      ibuprofen (ADVIL/MOTRIN) 800 MG tablet Take 800 mg by mouth every 6 hours as needed for pain      lisinopril-hydrochlorothiazide (ZESTORETIC) 10-12.5 MG tablet Take 1 tablet by mouth daily      omeprazole (RA OMEPRAZOLE) 20 MG tablet Take 20 mg by mouth daily      Potassium Bicarb-Citric Acid 10 MEQ TBEF Take  by mouth.      potassium chloride ER (K-TAB/KLOR-CON) 10 MEQ CR tablet Take 1 tablet by mouth daily at 2 pm.      ofloxacin (OCUFLOX) 0.3 % ophthalmic solution INSTILL 1 DROP 4 TIMES DAILY INTO EACH EYE FOR 7 DAYS (Patient not taking: Reported on 2025)      predniSONE (DELTASONE) 20 MG tablet Take 3 tabs by mouth daily x 3 days, then 2 tabs daily x 3 days, then 1 tab daily  "x 3 days, then 1/2 tab daily x 3 days. (Patient not taking: Reported on 6/18/2025) 20 tablet 0    predniSONE (DELTASONE) 20 MG tablet Take 3 tabs by mouth daily x 3 days, then 2 tabs daily x 3 days, then 1 tab daily x 3 days, then 1/2 tab daily x 3 days. (Patient not taking: Reported on 6/18/2025) 20 tablet 0    tobramycin-dexAMETHasone (TOBRADEX) 0.3-0.1 % ophthalmic suspension INSTILL 1 DROP 4 TIMES DAILY INTO LEFT EYE FOR 7 DAYS (Patient not taking: Reported on 6/18/2025)       Allergies   Allergen Reactions    Adhesive Tape     Amoxicillin Itching and Rash     No hives but intense itching and redness, suspect Amoxicillin as cause         Past medical history, past surgical history, current medications and allergies reviewed and accurate to the best of my knowledge.        OBJECTIVE:     /60 (BP Location: Right arm, Patient Position: Sitting, Cuff Size: Adult Regular)   Pulse 76   Temp 98.7  F (37.1  C) (Tympanic)   Resp 20   Ht 1.702 m (5' 7\")   Wt 100.7 kg (222 lb)   SpO2 97%   BMI 34.77 kg/m    Body mass index is 34.77 kg/m .      Physical Exam  General Appearance: Well appearing adult male, appropriate appearance for age. No acute distress  Orophayrnx: moist mucous membranes, pharynx without erythema, tonsils without hypertrophy, tonsils without erythema, no tonsillar exudates, no oral lesions, no palate petechiae, no post nasal drip seen, no trismus, voice clear.    Nose:  No noted drainage   Neck: supple without adenopathy  Respiratory: normal chest wall and respirations.  Normal effort.  Clear to auscultation bilaterally, no wheezing, crackles or rhonchi.  No increased work of breathing.  No cough appreciated.  Cardiac: RRR with no murmurs  Psychological: normal affect, alert, oriented, and pleasant.       Imaging and labs:  Results for orders placed or performed in visit on 06/18/25   XR Chest 2 Views     Status: None    Narrative    PROCEDURE:  XR CHEST 2 VIEWS    HISTORY: Productive cough; " Shortness of breath; History of pneumonia,  .    COMPARISON:  10/17/24    FINDINGS:  The cardiomediastinal contours are normal. The trachea is midline.  No focal consolidation, effusion or pneumothorax.    No suspicious osseous lesion or subdiaphragmatic free air.      Impression    IMPRESSION:      No acute cardiopulmonary process.      JOSY CARROLL MD         SYSTEM ID:  [id number]

## 2025-06-23 LAB — SCANNED LAB RESULT: NORMAL

## 2025-07-13 ENCOUNTER — HEALTH MAINTENANCE LETTER (OUTPATIENT)
Age: 40
End: 2025-07-13

## (undated) RX ORDER — ACETAMINOPHEN 500 MG
TABLET ORAL
Status: DISPENSED
Start: 2024-10-17

## (undated) RX ORDER — DEXAMETHASONE SODIUM PHOSPHATE 4 MG/ML
INJECTION, SOLUTION INTRA-ARTICULAR; INTRALESIONAL; INTRAMUSCULAR; INTRAVENOUS; SOFT TISSUE
Status: DISPENSED
Start: 2024-02-21

## (undated) RX ORDER — IBUPROFEN 400 MG/1
TABLET, FILM COATED ORAL
Status: DISPENSED
Start: 2022-12-20

## (undated) RX ORDER — ACETAMINOPHEN 325 MG/1
TABLET ORAL
Status: DISPENSED
Start: 2022-12-20

## (undated) RX ORDER — KETOROLAC TROMETHAMINE 30 MG/ML
INJECTION, SOLUTION INTRAMUSCULAR; INTRAVENOUS
Status: DISPENSED
Start: 2024-10-17

## (undated) RX ORDER — IBUPROFEN 200 MG
TABLET ORAL
Status: DISPENSED
Start: 2022-12-20

## (undated) RX ORDER — BENZONATATE 100 MG/1
CAPSULE ORAL
Status: DISPENSED
Start: 2022-12-20

## (undated) RX ORDER — CEFTRIAXONE 2 G/1
INJECTION, POWDER, FOR SOLUTION INTRAMUSCULAR; INTRAVENOUS
Status: DISPENSED
Start: 2024-10-17

## (undated) RX ORDER — DOXYCYCLINE 100 MG/1
CAPSULE ORAL
Status: DISPENSED
Start: 2024-10-17

## (undated) RX ORDER — LIDOCAINE 50 MG/G
PATCH TOPICAL
Status: DISPENSED
Start: 2022-12-20